# Patient Record
Sex: MALE | Race: WHITE | Employment: STUDENT | URBAN - METROPOLITAN AREA
[De-identification: names, ages, dates, MRNs, and addresses within clinical notes are randomized per-mention and may not be internally consistent; named-entity substitution may affect disease eponyms.]

---
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
## 2023-03-02 ENCOUNTER — OFFICE VISIT (OUTPATIENT)
Dept: PRIMARY CARE CLINIC | Age: 18
End: 2023-03-02

## 2023-03-02 VITALS
WEIGHT: 103 LBS | RESPIRATION RATE: 16 BRPM | DIASTOLIC BLOOD PRESSURE: 56 MMHG | BODY MASS INDEX: 17.16 KG/M2 | HEIGHT: 65 IN | SYSTOLIC BLOOD PRESSURE: 104 MMHG | HEART RATE: 54 BPM | TEMPERATURE: 98 F | OXYGEN SATURATION: 100 %

## 2023-03-02 DIAGNOSIS — K21.9 GASTROESOPHAGEAL REFLUX DISEASE, UNSPECIFIED WHETHER ESOPHAGITIS PRESENT: Primary | ICD-10-CM

## 2023-03-02 DIAGNOSIS — R10.13 EPIGASTRIC PAIN: ICD-10-CM

## 2023-03-02 PROCEDURE — 99213 OFFICE O/P EST LOW 20 MIN: CPT | Performed by: EMERGENCY MEDICINE

## 2023-03-02 RX ORDER — PANTOPRAZOLE SODIUM 40 MG/1
40 TABLET, DELAYED RELEASE ORAL
Qty: 30 TABLET | Refills: 0 | Status: SHIPPED | OUTPATIENT
Start: 2023-03-02

## 2023-03-02 RX ORDER — OMEPRAZOLE 20 MG/1
CAPSULE, DELAYED RELEASE ORAL
COMMUNITY
Start: 2023-02-20 | End: 2023-03-02 | Stop reason: ALTCHOICE

## 2023-03-02 RX ORDER — FLUDROCORTISONE ACETATE 0.1 MG/1
0.1 TABLET ORAL DAILY
COMMUNITY
Start: 2020-12-16

## 2023-03-02 RX ORDER — HYDROXYZINE HYDROCHLORIDE 10 MG/1
TABLET, FILM COATED ORAL
COMMUNITY
Start: 2020-12-13

## 2023-03-02 RX ORDER — FLUVOXAMINE MALEATE 100 MG
TABLET ORAL
COMMUNITY
Start: 2023-02-20

## 2023-03-02 RX ORDER — GUANFACINE 3 MG/1
TABLET, EXTENDED RELEASE ORAL
COMMUNITY
Start: 2023-02-20

## 2023-03-02 ASSESSMENT — ENCOUNTER SYMPTOMS
NAUSEA: 1
WHEEZING: 0
COUGH: 0
ABDOMINAL PAIN: 1
SORE THROAT: 0
BACK PAIN: 0
EYE PAIN: 0
DIARRHEA: 0
VOMITING: 0
SHORTNESS OF BREATH: 0
EYE DISCHARGE: 0
SINUS PRESSURE: 0
EYE REDNESS: 0

## 2023-03-02 NOTE — PROGRESS NOTES
Chief Complaint:   Gastroesophageal Reflux (Having severe reflux the last 12 hours, taking generic Nexium and it has not helped enough recently. Has not been able to get in with his gastroenterologist. Scheduled to see new PCP on 03/15/2023. Sleeping schedule has been off, unsure if this effecting symptoms per patient. Exercising making symptoms worse, having nausea and excessive gas. )      History of Present Illness   HPI:  Harriette Rinne is a 21 y.o. male who presents to [facility] today for worsening GERD symptoms, history of anxiety and GERD concomitantly. Current H2 and PPI not effective    Prior to Visit Medications    Medication Sig Taking?  Authorizing Provider   fludrocortisone (FLORINEF) 0.1 MG tablet Take 0.1 mg by mouth daily Yes Historical Provider, MD   fluvoxaMINE (LUVOX) 100 MG tablet  Yes Historical Provider, MD   guanFACINE HCl ER (INTUNIV) 3 MG TB24 tablet  Yes Historical Provider, MD   hydrOXYzine HCl (ATARAX) 10 MG tablet  Yes Historical Provider, MD   pantoprazole (PROTONIX) 40 MG tablet Take 1 tablet by mouth every morning (before breakfast) Yes Lev Ray,    busPIRone (BUSPAR) 10 MG tablet Take 10 mg by mouth 3 times daily Yes Historical Provider, MD   hydrOXYzine (VISTARIL) 25 MG capsule Take 25 mg by mouth 3 times daily as needed for Itching Yes Historical Provider, MD   polyethylene glycol (GLYCOLAX) 17 g packet Take 17 g by mouth daily as needed for Constipation  Patient not taking: Reported on 3/2/2023  Historical Provider, MD   Diphenhydramine-PE-APAP (Gillian Brakeman EXPRESSMAX PO) Take by mouth  Patient not taking: Reported on 3/2/2023  Historical Provider, MD   benzocaine-menthol (CEPACOL SORE THROAT) 15-3.6 MG lozenge Take 1 lozenge by mouth every 2 hours as needed for Sore Throat  Patient not taking: Reported on 3/2/2023  Historical Provider, MD   brompheniramine-pseudoephedrine-DM 30-2-10 MG/5ML syrup Take 5 mLs by mouth 4 times daily as needed for Congestion or Cough  Patient not taking: Reported on 3/2/2023  Jefferson Merrill MD       Review of Systems   Review of Systems   Constitutional:  Negative for chills and fever. HENT:  Negative for ear pain, sinus pressure and sore throat. Eyes:  Negative for pain, discharge and redness. Respiratory:  Negative for cough, shortness of breath and wheezing. Cardiovascular:  Negative for chest pain. Gastrointestinal:  Positive for abdominal pain and nausea. Negative for diarrhea and vomiting. Genitourinary:  Negative for dysuria and frequency. Musculoskeletal:  Negative for arthralgias and back pain. Skin:  Negative for rash and wound. Neurological:  Negative for weakness and headaches. Hematological:  Negative for adenopathy. Psychiatric/Behavioral: Negative. All other systems reviewed and are negative. Patient's medical, social, and family history reviewed    Past Medical History:  has no past medical history on file. Past Surgical History:  has no past surgical history on file. Social History:  reports that he has never smoked. He has never used smokeless tobacco. He reports that he does not drink alcohol and does not use drugs. Family History: family history is not on file. Allergies: Patient has no known allergies. Physical Exam   Vital Signs:  BP (!) 104/56 (Site: Right Upper Arm, Position: Sitting, Cuff Size: Medium Adult)   Pulse 54   Temp 98 °F (36.7 °C) (Temporal)   Resp 16   Ht 5' 5\" (1.651 m)   Wt 103 lb (46.7 kg)   SpO2 100%   BMI 17.14 kg/m²    Oxygen Saturation Interpretation: Normal.    Physical Exam  Vitals and nursing note reviewed. Constitutional:       Appearance: He is well-developed. HENT:      Head: Normocephalic and atraumatic. Eyes:      Pupils: Pupils are equal, round, and reactive to light. Cardiovascular:      Rate and Rhythm: Normal rate and regular rhythm. Heart sounds: Normal heart sounds. No murmur heard.   Pulmonary:      Effort: Pulmonary effort is normal. No respiratory distress. Breath sounds: Normal breath sounds. No wheezing or rales. Abdominal:      General: Bowel sounds are normal.      Palpations: Abdomen is soft. Tenderness: There is abdominal tenderness in the epigastric area. There is no guarding or rebound. Musculoskeletal:      Cervical back: Normal range of motion and neck supple. Skin:     General: Skin is warm and dry. Neurological:      Mental Status: He is alert and oriented to person, place, and time. Cranial Nerves: No cranial nerve deficit. Coordination: Coordination normal.       Test Results Section   (All laboratory and radiology results have been personally reviewed by myself)  Labs:  No results found for this visit on 03/02/23. Imaging: All Radiology results interpreted by Radiologist unless otherwise noted. No results found. Assessment / Plan   Impression(s):  Saba Pierre was seen today for gastroesophageal reflux. Diagnoses and all orders for this visit:    Gastroesophageal reflux disease, unspecified whether esophagitis present  -     pantoprazole (PROTONIX) 40 MG tablet; Take 1 tablet by mouth every morning (before breakfast)    Epigastric pain  -     pantoprazole (PROTONIX) 40 MG tablet; Take 1 tablet by mouth every morning (before breakfast)       Discussed symptomatic treatments with the patient today. The patient is to schedule a follow-up with PCP in the next 2-3 days for reevaluation. Red flag symptoms were also discussed with the patient today. If symptoms worsen the patient is to go directly to the emergency department for reevaluation and treatment. Pt verbalizes understanding and is in agreement with plan of care. All questions answered.       New Medications     New Prescriptions    PANTOPRAZOLE (PROTONIX) 40 MG TABLET    Take 1 tablet by mouth every morning (before breakfast)       Electronically signed by Asia Bhatt DO   DD: 3/2/23

## 2023-03-20 ENCOUNTER — OFFICE VISIT (OUTPATIENT)
Dept: PRIMARY CARE CLINIC | Age: 18
End: 2023-03-20

## 2023-03-20 VITALS
HEART RATE: 50 BPM | HEIGHT: 64 IN | WEIGHT: 105.6 LBS | BODY MASS INDEX: 18.03 KG/M2 | DIASTOLIC BLOOD PRESSURE: 65 MMHG | TEMPERATURE: 97.9 F | SYSTOLIC BLOOD PRESSURE: 96 MMHG | OXYGEN SATURATION: 98 %

## 2023-03-20 DIAGNOSIS — F42.9 OBSESSIVE-COMPULSIVE DISORDER, UNSPECIFIED TYPE: ICD-10-CM

## 2023-03-20 DIAGNOSIS — K59.00 CONSTIPATION, UNSPECIFIED CONSTIPATION TYPE: Primary | ICD-10-CM

## 2023-03-20 DIAGNOSIS — Z11.59 ENCOUNTER FOR HEPATITIS C SCREENING TEST FOR LOW RISK PATIENT: ICD-10-CM

## 2023-03-20 DIAGNOSIS — Z11.4 ENCOUNTER FOR SCREENING FOR HIV: ICD-10-CM

## 2023-03-20 DIAGNOSIS — K21.9 GASTROESOPHAGEAL REFLUX DISEASE, UNSPECIFIED WHETHER ESOPHAGITIS PRESENT: ICD-10-CM

## 2023-03-20 DIAGNOSIS — R55 SYNCOPE AND COLLAPSE: ICD-10-CM

## 2023-03-20 DIAGNOSIS — F90.9 ATTENTION DEFICIT HYPERACTIVITY DISORDER (ADHD), UNSPECIFIED ADHD TYPE: ICD-10-CM

## 2023-03-20 DIAGNOSIS — Z76.89 ENCOUNTER TO ESTABLISH CARE WITH NEW DOCTOR: ICD-10-CM

## 2023-03-20 LAB
CHP ED QC CHECK: NORMAL
GLUCOSE BLD-MCNC: 94 MG/DL

## 2023-03-20 PROCEDURE — 82962 GLUCOSE BLOOD TEST: CPT | Performed by: FAMILY MEDICINE

## 2023-03-20 PROCEDURE — 99204 OFFICE O/P NEW MOD 45 MIN: CPT | Performed by: FAMILY MEDICINE

## 2023-03-20 RX ORDER — DOCUSATE SODIUM 100 MG/1
100 CAPSULE, LIQUID FILLED ORAL 2 TIMES DAILY
Qty: 60 CAPSULE | Refills: 0 | Status: SHIPPED
Start: 2023-03-20 | End: 2023-03-31

## 2023-03-20 RX ORDER — POLYETHYLENE GLYCOL 3350 17 G/17G
17 POWDER, FOR SOLUTION ORAL DAILY PRN
Qty: 1530 G | Refills: 0 | Status: SHIPPED
Start: 2023-03-20 | End: 2023-03-31

## 2023-03-20 SDOH — ECONOMIC STABILITY: FOOD INSECURITY: WITHIN THE PAST 12 MONTHS, YOU WORRIED THAT YOUR FOOD WOULD RUN OUT BEFORE YOU GOT MONEY TO BUY MORE.: NEVER TRUE

## 2023-03-20 SDOH — ECONOMIC STABILITY: INCOME INSECURITY: HOW HARD IS IT FOR YOU TO PAY FOR THE VERY BASICS LIKE FOOD, HOUSING, MEDICAL CARE, AND HEATING?: NOT VERY HARD

## 2023-03-20 SDOH — ECONOMIC STABILITY: FOOD INSECURITY: WITHIN THE PAST 12 MONTHS, THE FOOD YOU BOUGHT JUST DIDN'T LAST AND YOU DIDN'T HAVE MONEY TO GET MORE.: NEVER TRUE

## 2023-03-20 SDOH — ECONOMIC STABILITY: HOUSING INSECURITY
IN THE LAST 12 MONTHS, WAS THERE A TIME WHEN YOU DID NOT HAVE A STEADY PLACE TO SLEEP OR SLEPT IN A SHELTER (INCLUDING NOW)?: NO

## 2023-03-20 ASSESSMENT — ENCOUNTER SYMPTOMS
ABDOMINAL DISTENTION: 0
RHINORRHEA: 0
BLOOD IN STOOL: 0
EYE DISCHARGE: 0
VOMITING: 0
WHEEZING: 0
SHORTNESS OF BREATH: 0
ABDOMINAL PAIN: 1
CONSTIPATION: 0
SORE THROAT: 0
COUGH: 0
NAUSEA: 0
EYE PAIN: 0
SINUS PRESSURE: 0
CHEST TIGHTNESS: 0
COLOR CHANGE: 0
BACK PAIN: 0
DIARRHEA: 0

## 2023-03-20 ASSESSMENT — PATIENT HEALTH QUESTIONNAIRE - PHQ9
SUM OF ALL RESPONSES TO PHQ9 QUESTIONS 1 & 2: 0
SUM OF ALL RESPONSES TO PHQ QUESTIONS 1-9: 0
2. FEELING DOWN, DEPRESSED OR HOPELESS: 0
SUM OF ALL RESPONSES TO PHQ QUESTIONS 1-9: 0
SUM OF ALL RESPONSES TO PHQ QUESTIONS 1-9: 0
1. LITTLE INTEREST OR PLEASURE IN DOING THINGS: 0
SUM OF ALL RESPONSES TO PHQ QUESTIONS 1-9: 0

## 2023-03-20 ASSESSMENT — LIFESTYLE VARIABLES
HOW OFTEN DO YOU HAVE A DRINK CONTAINING ALCOHOL: NEVER
HOW MANY STANDARD DRINKS CONTAINING ALCOHOL DO YOU HAVE ON A TYPICAL DAY: PATIENT DOES NOT DRINK

## 2023-03-20 NOTE — PROGRESS NOTES
Conjunctiva/sclera: Conjunctivae normal.      Pupils: Pupils are equal, round, and reactive to light. Cardiovascular:      Rate and Rhythm: Normal rate and regular rhythm. Heart sounds: Normal heart sounds. No murmur heard. No friction rub. No gallop. Pulmonary:      Effort: Pulmonary effort is normal.      Breath sounds: Normal breath sounds. Abdominal:      General: Bowel sounds are normal.      Palpations: Abdomen is soft. Tenderness: There is no abdominal tenderness. Hernia: No hernia is present. Musculoskeletal:         General: Normal range of motion. Cervical back: Normal range of motion and neck supple. Skin:     General: Skin is warm and dry. Neurological:      Mental Status: He is alert and oriented to person, place, and time. Psychiatric:         Behavior: Behavior normal.       ASSESSMENT/PLAN:  1. Constipation, unspecified constipation type  AXR placed with information on low FODMAP diet. Previously followed with Bryon MCBRIDE. Referral to GI for chronic constipation and gerd. - XR ABDOMEN (2 VIEWS); Future  - Joann Mendez MD, Gastroenterology, [place]    2. Gastroesophageal reflux disease, unspecified whether esophagitis present  GERD diet and conservative treatment. Continue Protonix. Baseline blood work ordered. - Comprehensive Metabolic Panel; Future  - Joann Mendez MD, Gastroenterology, [facility]  - POCT Glucose  - Allergen, Food, Comprehensive Profile 1; Future    3. Syncope and collapse  Historic. Patient use to follow with Dr. Jennifer Fitzgerald at [facility]. On prn Florinef. Increase hydration, salt, and recommend compression stockings. Proactive measures discussed if he feels episode coming on. Referral placed to Cardiology for patient to get established. Last EKG and Echo on record 2019. - Chastity Saul MD, Cardiology, [facility]    4. Obsessive-compulsive disorder, unspecified type    5.  Attention deficit hyperactivity disorder (ADHD),

## 2023-03-21 ENCOUNTER — NURSE ONLY (OUTPATIENT)
Dept: PRIMARY CARE CLINIC | Age: 18
End: 2023-03-21

## 2023-03-21 DIAGNOSIS — Z11.59 ENCOUNTER FOR HEPATITIS C SCREENING TEST FOR LOW RISK PATIENT: ICD-10-CM

## 2023-03-21 DIAGNOSIS — Z11.4 ENCOUNTER FOR SCREENING FOR HIV: ICD-10-CM

## 2023-03-21 DIAGNOSIS — K21.9 GASTROESOPHAGEAL REFLUX DISEASE, UNSPECIFIED WHETHER ESOPHAGITIS PRESENT: ICD-10-CM

## 2023-03-21 DIAGNOSIS — Z76.89 ENCOUNTER TO ESTABLISH CARE WITH NEW DOCTOR: ICD-10-CM

## 2023-03-21 LAB
ALBUMIN SERPL-MCNC: 4.6 G/DL (ref 3.5–5.2)
ALP SERPL-CCNC: 94 U/L (ref 40–129)
ALT SERPL-CCNC: 6 U/L (ref 0–40)
ANION GAP SERPL CALCULATED.3IONS-SCNC: 16 MMOL/L (ref 7–16)
AST SERPL-CCNC: 20 U/L (ref 0–39)
BASOPHILS # BLD: 0.07 E9/L (ref 0–0.2)
BASOPHILS NFR BLD: 1.3 % (ref 0–2)
BILIRUB SERPL-MCNC: <0.2 MG/DL (ref 0–1.2)
BUN SERPL-MCNC: 11 MG/DL (ref 6–20)
CALCIUM SERPL-MCNC: 9.9 MG/DL (ref 8.6–10.2)
CHLORIDE SERPL-SCNC: 106 MMOL/L (ref 98–107)
CHOLESTEROL, TOTAL: 99 MG/DL (ref 0–199)
CO2 SERPL-SCNC: 23 MMOL/L (ref 22–29)
CREAT SERPL-MCNC: 0.7 MG/DL (ref 0.7–1.2)
EOSINOPHIL # BLD: 0.12 E9/L (ref 0.05–0.5)
EOSINOPHIL NFR BLD: 2.3 % (ref 0–6)
ERYTHROCYTE [DISTWIDTH] IN BLOOD BY AUTOMATED COUNT: 12.2 FL (ref 11.5–15)
GLUCOSE SERPL-MCNC: 89 MG/DL (ref 74–99)
HCT VFR BLD AUTO: 39.8 % (ref 37–54)
HDLC SERPL-MCNC: 32 MG/DL
HGB BLD-MCNC: 13 G/DL (ref 12.5–16.5)
IMM GRANULOCYTES # BLD: 0.01 E9/L
IMM GRANULOCYTES NFR BLD: 0.2 % (ref 0–5)
LDLC SERPL CALC-MCNC: 61 MG/DL (ref 0–99)
LYMPHOCYTES # BLD: 1.82 E9/L (ref 1.5–4)
LYMPHOCYTES NFR BLD: 34.9 % (ref 20–42)
MCH RBC QN AUTO: 27.5 PG (ref 26–35)
MCHC RBC AUTO-ENTMCNC: 32.7 % (ref 32–34.5)
MCV RBC AUTO: 84.3 FL (ref 80–99.9)
MONOCYTES # BLD: 0.36 E9/L (ref 0.1–0.95)
MONOCYTES NFR BLD: 6.9 % (ref 2–12)
NEUTROPHILS # BLD: 2.84 E9/L (ref 1.8–7.3)
NEUTS SEG NFR BLD: 54.4 % (ref 43–80)
PLATELET # BLD AUTO: 256 E9/L (ref 130–450)
PMV BLD AUTO: 11.3 FL (ref 7–12)
POTASSIUM SERPL-SCNC: 4.7 MMOL/L (ref 3.5–5)
PROT SERPL-MCNC: 6.8 G/DL (ref 6.4–8.3)
RBC # BLD AUTO: 4.72 E12/L (ref 3.8–5.8)
SODIUM SERPL-SCNC: 145 MMOL/L (ref 132–146)
TRIGL SERPL-MCNC: 32 MG/DL (ref 0–149)
TSH SERPL-MCNC: 1.45 UIU/ML (ref 0.27–4.2)
VITAMIN D 25-HYDROXY: 27 NG/ML (ref 30–100)
VLDLC SERPL CALC-MCNC: 6 MG/DL
WBC # BLD: 5.2 E9/L (ref 4.5–11.5)

## 2023-03-22 ENCOUNTER — HOSPITAL ENCOUNTER (OUTPATIENT)
Dept: GENERAL RADIOLOGY | Age: 18
Discharge: HOME | End: 2023-03-24
Payer: COMMERCIAL

## 2023-03-22 ENCOUNTER — HOSPITAL ENCOUNTER (OUTPATIENT)
Age: 18
Discharge: HOME | End: 2023-03-24
Payer: COMMERCIAL

## 2023-03-22 DIAGNOSIS — K59.00 CONSTIPATION, UNSPECIFIED CONSTIPATION TYPE: ICD-10-CM

## 2023-03-22 LAB
HCV AB SERPL QL IA: NORMAL
HIV1+2 AB SERPL QL IA: NORMAL

## 2023-03-22 PROCEDURE — 74019 RADEX ABDOMEN 2 VIEWS: CPT

## 2023-03-27 ENCOUNTER — INITIAL CONSULT (OUTPATIENT)
Dept: GASTROENTEROLOGY | Age: 18
End: 2023-03-27
Payer: COMMERCIAL

## 2023-03-27 VITALS
SYSTOLIC BLOOD PRESSURE: 104 MMHG | TEMPERATURE: 97 F | DIASTOLIC BLOOD PRESSURE: 62 MMHG | RESPIRATION RATE: 16 BRPM | WEIGHT: 106 LBS | HEART RATE: 52 BPM | BODY MASS INDEX: 18.1 KG/M2 | HEIGHT: 64 IN | OXYGEN SATURATION: 99 %

## 2023-03-27 DIAGNOSIS — R11.0 NAUSEA: ICD-10-CM

## 2023-03-27 DIAGNOSIS — K59.00 CONSTIPATION, UNSPECIFIED CONSTIPATION TYPE: ICD-10-CM

## 2023-03-27 DIAGNOSIS — R10.9 ABDOMINAL PAIN, UNSPECIFIED ABDOMINAL LOCATION: Primary | ICD-10-CM

## 2023-03-27 PROCEDURE — G8419 CALC BMI OUT NRM PARAM NOF/U: HCPCS | Performed by: NURSE PRACTITIONER

## 2023-03-27 PROCEDURE — G8484 FLU IMMUNIZE NO ADMIN: HCPCS | Performed by: NURSE PRACTITIONER

## 2023-03-27 PROCEDURE — 99202 OFFICE O/P NEW SF 15 MIN: CPT | Performed by: NURSE PRACTITIONER

## 2023-03-27 PROCEDURE — G8427 DOCREV CUR MEDS BY ELIG CLIN: HCPCS | Performed by: NURSE PRACTITIONER

## 2023-03-27 PROCEDURE — 1036F TOBACCO NON-USER: CPT | Performed by: NURSE PRACTITIONER

## 2023-03-27 RX ORDER — PANTOPRAZOLE SODIUM 40 MG/1
40 TABLET, DELAYED RELEASE ORAL 2 TIMES DAILY
Qty: 60 TABLET | Refills: 4 | Status: SHIPPED | OUTPATIENT
Start: 2023-03-27

## 2023-03-27 RX ORDER — BUSPIRONE HYDROCHLORIDE 10 MG/1
10 TABLET ORAL 3 TIMES DAILY
COMMUNITY

## 2023-03-27 NOTE — PROGRESS NOTES
is advised to rule out gastropathy. He is agreeable to proceed    3. Constipation, unspecified constipation type    -advised patient to continue dulcolax twice daily and add Miralax 1 scoop a day. He may tailor it to his needs. -RTV 4 weeks    Return for Follow up in 4 weeks. An electronic signature was used to authenticate this note.     --Yokasta Goel, ARTUR - CNP

## 2023-03-29 ENCOUNTER — TELEPHONE (OUTPATIENT)
Dept: PRIMARY CARE CLINIC | Age: 18
End: 2023-03-29

## 2023-03-29 RX ORDER — LACTULOSE 10 G/15ML
20 SOLUTION ORAL DAILY PRN
Qty: 237 ML | Refills: 0 | Status: SHIPPED | OUTPATIENT
Start: 2023-03-29

## 2023-03-29 NOTE — TELEPHONE ENCOUNTER
Chronic constipation and stool retention evidenced on his xray. Continue the colace and miralax daily. I sent lactulose to use daily PRN constipation, it should stimulate a bm.  If this fails as well, there are better medications to use but insurance usually requires a trial on this first.

## 2023-03-29 NOTE — TELEPHONE ENCOUNTER
Pt has been constipated x 2 days been taking colace twice a day and miralax. He is having abdominal pain, no appetite and gas.  Wants to know what else can he do or should he come in?

## 2023-03-31 ENCOUNTER — APPOINTMENT (OUTPATIENT)
Dept: CT IMAGING | Age: 18
End: 2023-03-31
Payer: COMMERCIAL

## 2023-03-31 ENCOUNTER — HOSPITAL ENCOUNTER (EMERGENCY)
Age: 18
Discharge: HOME | End: 2023-03-31
Attending: EMERGENCY MEDICINE
Payer: COMMERCIAL

## 2023-03-31 VITALS
DIASTOLIC BLOOD PRESSURE: 58 MMHG | RESPIRATION RATE: 16 BRPM | TEMPERATURE: 97.6 F | HEART RATE: 70 BPM | OXYGEN SATURATION: 100 % | SYSTOLIC BLOOD PRESSURE: 99 MMHG

## 2023-03-31 DIAGNOSIS — K59.00 CONSTIPATION, UNSPECIFIED CONSTIPATION TYPE: Primary | ICD-10-CM

## 2023-03-31 LAB
ALBUMIN SERPL-MCNC: 4.7 G/DL (ref 3.5–5.2)
ALP SERPL-CCNC: 96 U/L (ref 40–129)
ALT SERPL-CCNC: 14 U/L (ref 0–40)
ANION GAP SERPL CALCULATED.3IONS-SCNC: 9 MMOL/L (ref 7–16)
AST SERPL-CCNC: 19 U/L (ref 0–39)
BASOPHILS # BLD: 0.05 E9/L (ref 0–0.2)
BASOPHILS NFR BLD: 0.9 % (ref 0–2)
BILIRUB SERPL-MCNC: 0.5 MG/DL (ref 0–1.2)
BUN SERPL-MCNC: 12 MG/DL (ref 6–20)
CALCIUM SERPL-MCNC: 9.9 MG/DL (ref 8.6–10.2)
CHLORIDE SERPL-SCNC: 102 MMOL/L (ref 98–107)
CO2 SERPL-SCNC: 28 MMOL/L (ref 22–29)
CREAT SERPL-MCNC: 0.8 MG/DL (ref 0.7–1.2)
EOSINOPHIL # BLD: 0.18 E9/L (ref 0.05–0.5)
EOSINOPHIL NFR BLD: 3.1 % (ref 0–6)
ERYTHROCYTE [DISTWIDTH] IN BLOOD BY AUTOMATED COUNT: 12 FL (ref 11.5–15)
GLUCOSE SERPL-MCNC: 103 MG/DL (ref 74–99)
HCT VFR BLD AUTO: 42.8 % (ref 37–54)
HGB BLD-MCNC: 14.1 G/DL (ref 12.5–16.5)
IMM GRANULOCYTES # BLD: 0.01 E9/L
IMM GRANULOCYTES NFR BLD: 0.2 % (ref 0–5)
LACTATE BLDV-SCNC: 0.7 MMOL/L (ref 0.5–2.2)
LIPASE: 18 U/L (ref 13–60)
LYMPHOCYTES # BLD: 1.65 E9/L (ref 1.5–4)
LYMPHOCYTES NFR BLD: 28.4 % (ref 20–42)
MCH RBC QN AUTO: 27.5 PG (ref 26–35)
MCHC RBC AUTO-ENTMCNC: 32.9 % (ref 32–34.5)
MCV RBC AUTO: 83.6 FL (ref 80–99.9)
MONOCYTES # BLD: 0.45 E9/L (ref 0.1–0.95)
MONOCYTES NFR BLD: 7.7 % (ref 2–12)
NEUTROPHILS # BLD: 3.47 E9/L (ref 1.8–7.3)
NEUTS SEG NFR BLD: 59.7 % (ref 43–80)
PLATELET # BLD AUTO: 241 E9/L (ref 130–450)
PMV BLD AUTO: 10.7 FL (ref 7–12)
POTASSIUM SERPL-SCNC: 4.5 MMOL/L (ref 3.5–5)
PROT SERPL-MCNC: 7.4 G/DL (ref 6.4–8.3)
RBC # BLD AUTO: 5.12 E12/L (ref 3.8–5.8)
SODIUM SERPL-SCNC: 139 MMOL/L (ref 132–146)
WBC # BLD: 5.8 E9/L (ref 4.5–11.5)

## 2023-03-31 PROCEDURE — 74176 CT ABD & PELVIS W/O CONTRAST: CPT

## 2023-03-31 PROCEDURE — 83605 ASSAY OF LACTIC ACID: CPT

## 2023-03-31 PROCEDURE — 83690 ASSAY OF LIPASE: CPT

## 2023-03-31 PROCEDURE — 99284 EMERGENCY DEPT VISIT MOD MDM: CPT

## 2023-03-31 PROCEDURE — 85025 COMPLETE CBC W/AUTO DIFF WBC: CPT

## 2023-03-31 PROCEDURE — 80053 COMPREHEN METABOLIC PANEL: CPT

## 2023-03-31 PROCEDURE — 36415 COLL VENOUS BLD VENIPUNCTURE: CPT

## 2023-03-31 RX ORDER — SENNOSIDES 8.6 MG
1 TABLET ORAL NIGHTLY PRN
Qty: 120 TABLET | Refills: 0 | Status: SHIPPED | OUTPATIENT
Start: 2023-03-31

## 2023-03-31 RX ORDER — DOCUSATE SODIUM 100 MG/1
100 CAPSULE, LIQUID FILLED ORAL 2 TIMES DAILY
Qty: 60 CAPSULE | Refills: 0 | Status: SHIPPED | OUTPATIENT
Start: 2023-03-31 | End: 2023-04-30

## 2023-03-31 RX ORDER — BISACODYL 10 MG
10 SUPPOSITORY, RECTAL RECTAL DAILY PRN
Qty: 30 SUPPOSITORY | Refills: 0 | Status: SHIPPED | OUTPATIENT
Start: 2023-03-31 | End: 2023-04-30

## 2023-03-31 RX ORDER — POLYETHYLENE GLYCOL 3350 17 G/17G
17 POWDER, FOR SOLUTION ORAL DAILY
Qty: 510 G | Refills: 0 | Status: SHIPPED | OUTPATIENT
Start: 2023-03-31 | End: 2023-04-30

## 2023-03-31 ASSESSMENT — PAIN - FUNCTIONAL ASSESSMENT: PAIN_FUNCTIONAL_ASSESSMENT: NONE - DENIES PAIN

## 2023-03-31 NOTE — Clinical Note
Rick Pavon was seen and treated in our emergency department on 3/31/2023. He may return to school on 04/03/2023. If you have any questions or concerns, please don't hesitate to call.       Dayron Prieto MD

## 2023-04-03 ENCOUNTER — OFFICE VISIT (OUTPATIENT)
Dept: PRIMARY CARE CLINIC | Age: 18
End: 2023-04-03
Payer: COMMERCIAL

## 2023-04-03 VITALS
HEIGHT: 65 IN | RESPIRATION RATE: 16 BRPM | WEIGHT: 104.6 LBS | BODY MASS INDEX: 17.43 KG/M2 | HEART RATE: 53 BPM | TEMPERATURE: 97.6 F | DIASTOLIC BLOOD PRESSURE: 58 MMHG | OXYGEN SATURATION: 98 % | SYSTOLIC BLOOD PRESSURE: 118 MMHG

## 2023-04-03 DIAGNOSIS — K59.00 CONSTIPATION, UNSPECIFIED CONSTIPATION TYPE: Primary | ICD-10-CM

## 2023-04-03 DIAGNOSIS — Z87.898 HISTORY OF SYNCOPE: ICD-10-CM

## 2023-04-03 DIAGNOSIS — E55.9 VITAMIN D DEFICIENCY: ICD-10-CM

## 2023-04-03 DIAGNOSIS — K21.9 GASTROESOPHAGEAL REFLUX DISEASE, UNSPECIFIED WHETHER ESOPHAGITIS PRESENT: ICD-10-CM

## 2023-04-03 PROCEDURE — G8419 CALC BMI OUT NRM PARAM NOF/U: HCPCS | Performed by: FAMILY MEDICINE

## 2023-04-03 PROCEDURE — 1036F TOBACCO NON-USER: CPT | Performed by: FAMILY MEDICINE

## 2023-04-03 PROCEDURE — 99214 OFFICE O/P EST MOD 30 MIN: CPT | Performed by: FAMILY MEDICINE

## 2023-04-03 PROCEDURE — G8427 DOCREV CUR MEDS BY ELIG CLIN: HCPCS | Performed by: FAMILY MEDICINE

## 2023-04-03 NOTE — PROGRESS NOTES
[facility])  Family Medicine Outpatient        SUBJECTIVE:  CC: had concerns including Constipation (Symptoms are worse since last visit. No nausea/vomiting. Last BM was yesterday at 3 a.m.). HPI:  Peyman Murguia is a male 21 y.o. presented to the clinic for a follow up apt. On 3/27 he saw gi for constipation, gerd, and abdominal pain x 6 years. Protonix was increased to bid and has helped some. An EGD is pending. He was in the ED 3/31 for worsening abdominal pain. He notes it had been 5 days since he went to the bathroom. He was discharged on Dulcolax and Miralax. He forgot all his medication yesterday. Stopped Colace Saturday. His last bm was yesterday. Review of Systems   Constitutional:  Negative for appetite change, fatigue and fever. Respiratory:  Negative for cough, shortness of breath and wheezing. Cardiovascular:  Negative for chest pain and palpitations. Gastrointestinal:  Positive for constipation. Negative for abdominal pain, diarrhea, nausea and vomiting.         Gerd     Outpatient Medications Marked as Taking for the 4/3/23 encounter (Office Visit) with Sayra Centeno MD   Medication Sig Dispense Refill    polyethylene glycol (GLYCOLAX) 17 GM/SCOOP powder Take 17 g by mouth daily 510 g 0    docusate sodium (COLACE) 100 MG capsule Take 1 capsule by mouth 2 times daily 60 capsule 0    bisacodyl (DULCOLAX) 10 MG suppository Place 1 suppository rectally daily as needed for Constipation 30 suppository 0    senna (SENOKOT) 8.6 MG TABS tablet Take 1 tablet by mouth nightly as needed for Constipation 120 tablet 0    busPIRone (BUSPAR) 10 MG tablet Take 1 tablet by mouth in the morning and at bedtime      pantoprazole (PROTONIX) 40 MG tablet Take 1 tablet by mouth 2 times daily 60 tablet 4    fluvoxaMINE (LUVOX) 100 MG tablet Take 1 tablet by mouth nightly      guanFACINE HCl ER (INTUNIV) 3 MG TB24 tablet Take 1 tablet by mouth daily      hydrOXYzine (VISTARIL) 25 MG capsule Take 1 capsule by

## 2023-04-03 NOTE — ED PROVIDER NOTES
Financial Resource Strain: Low Risk     Difficulty of Paying Living Expenses: Not very hard   Food Insecurity: No Food Insecurity    Worried About Running Out of Food in the Last Year: Never true    Ran Out of Food in the Last Year: Never true   Transportation Needs: Unknown    Lack of Transportation (Non-Medical): No   Housing Stability: Unknown    Unstable Housing in the Last Year: No       Family History:   Family History   Problem Relation Age of Onset    Other Mother         IBS    Anxiety Disorder Mother     Anxiety Disorder Sister     Autism Sister     Anxiety Disorder Brother     Asthma Brother        The patients home medications have been reviewed. Allergies:   No Known Allergies        ---------------------------------------------------PHYSICAL EXAM--------------------------------------    BP (!) 107/56   Pulse (!) 48   Temp 97.6 °F (36.4 °C) (Temporal)   Resp 16   SpO2 97%     Physical Exam  Vitals and nursing note reviewed. Constitutional:       General: He is not in acute distress. Appearance: He is not toxic-appearing. HENT:      Mouth/Throat:      Mouth: Mucous membranes are moist.   Eyes:      General: No scleral icterus. Extraocular Movements: Extraocular movements intact. Pupils: Pupils are equal, round, and reactive to light. Cardiovascular:      Rate and Rhythm: Normal rate and regular rhythm. Pulses: Normal pulses. Heart sounds: Normal heart sounds. No murmur heard. Pulmonary:      Effort: Pulmonary effort is normal. No respiratory distress. Breath sounds: Normal breath sounds. No wheezing or rales. Abdominal:      General: There is no distension. Palpations: Abdomen is soft. Tenderness: There is abdominal tenderness (mild diffuse nonfocal). There is no guarding or rebound. Musculoskeletal:         General: No swelling or tenderness. Normal range of motion. Cervical back: Normal range of motion and neck supple. No rigidity.  No Cibinqo Pregnancy And Lactation Text: It is unknown if this medication will adversely affect pregnancy or breast feeding.  You should not take this medication if you are currently pregnant or planning a pregnancy or while breastfeeding.

## 2023-04-05 PROBLEM — F42.9 OBSESSIVE-COMPULSIVE DISORDER: Status: ACTIVE | Noted: 2023-04-05

## 2023-04-05 PROBLEM — K21.9 GASTROESOPHAGEAL REFLUX DISEASE: Status: ACTIVE | Noted: 2023-04-05

## 2023-04-05 PROBLEM — K59.00 CONSTIPATION: Status: ACTIVE | Noted: 2023-04-05

## 2023-04-05 PROBLEM — R55 SYNCOPE AND COLLAPSE: Status: ACTIVE | Noted: 2023-04-05

## 2023-04-05 PROBLEM — F90.9 ATTENTION DEFICIT HYPERACTIVITY DISORDER (ADHD): Status: ACTIVE | Noted: 2023-04-05

## 2023-04-09 PROBLEM — E55.9 VITAMIN D DEFICIENCY: Status: ACTIVE | Noted: 2023-04-09

## 2023-04-09 PROBLEM — Z87.898 HISTORY OF SYNCOPE: Status: ACTIVE | Noted: 2023-04-09

## 2023-04-09 LAB
BARLEY IGE QN: <0.1 KU/L (ref 0–0.34)
BEEF IGE QN: <0.1 KU/L (ref 0–0.34)
BELL PEPPER IGE QN: <0.1 KU/L (ref 0–0.34)
CABBAGE IGE QN: <0.1 KU/L (ref 0–0.34)
CARROT IGE QN: <0.1 KU/L (ref 0–0.34)
CHICKEN SERUM PROT IGE QN: <0.1 KU/L (ref 0–0.34)
CODFISH IGE QN: <0.1 KU/L (ref 0–0.34)
CORN IGE QN: <0.1 KU/L (ref 0–0.34)
COW MILK IGE QN: 0.15 KU/L (ref 0–0.34)
CRAB IGE QN: <0.1 KU/L (ref 0–0.34)
EGG WHITE IGE QN: <0.1 KU/L (ref 0–0.34)
GRAPE IGE QN: <0.1 KU/L (ref 0–0.34)
IGE SERPL-ACNC: 34 IU/ML
LETTUCE IGE QN: <0.1 KU/L (ref 0–0.34)
OAT IGE QN: <0.1 KU/L (ref 0–0.34)
ORANGE IGE QN: <0.1 KU/L (ref 0–0.34)
PEANUT IGE QN: <0.1 KU/L (ref 0–0.34)
PORK IGE QN: <0.1 KU/L (ref 0–0.34)
POTATO IGE QN: <0.1 KU/L (ref 0–0.34)
RICE IGE QN: <0.1 KU/L (ref 0–0.34)
RYE IGE QN: <0.1 KU/L (ref 0–0.34)
SHRIMP IGE QN: <0.1 KU/L (ref 0–0.34)
SOYBEAN IGE QN: <0.1 KU/L (ref 0–0.34)
TOMATO IGE QN: <0.1 KU/L (ref 0–0.34)
TUNA IGE QN: <0.1 KU/L (ref 0–0.34)
WHEAT IGE QN: <0.1 KU/L (ref 0–0.34)
WHITE BEAN IGE QN: <0.1 KU/L (ref 0–0.34)

## 2023-04-09 ASSESSMENT — ENCOUNTER SYMPTOMS
ABDOMINAL PAIN: 0
DIARRHEA: 0
CONSTIPATION: 1
WHEEZING: 0
VOMITING: 0
SHORTNESS OF BREATH: 0
COUGH: 0
NAUSEA: 0

## 2023-04-10 ENCOUNTER — TELEPHONE (OUTPATIENT)
Dept: ADMINISTRATIVE | Age: 18
End: 2023-04-10

## 2023-04-19 ENCOUNTER — TELEPHONE (OUTPATIENT)
Dept: PRIMARY CARE CLINIC | Age: 18
End: 2023-04-19

## 2023-04-19 NOTE — TELEPHONE ENCOUNTER
Received VM from patient stating he had requested a referral for a dietician, has not yet been contacted. Called patient, notified him the only referral I saw was for Gastroenterology. Patient stated he was contacted by Gastroenterology & has an appointment for 4/26/2023. Stated he would like a referral placed for a dietician as soon as possible.

## 2023-04-28 ENCOUNTER — OFFICE VISIT (OUTPATIENT)
Dept: GASTROENTEROLOGY | Age: 18
End: 2023-04-28
Payer: COMMERCIAL

## 2023-04-28 VITALS
OXYGEN SATURATION: 99 % | DIASTOLIC BLOOD PRESSURE: 74 MMHG | WEIGHT: 108 LBS | BODY MASS INDEX: 17.99 KG/M2 | TEMPERATURE: 97.4 F | RESPIRATION RATE: 18 BRPM | SYSTOLIC BLOOD PRESSURE: 128 MMHG | HEIGHT: 65 IN | HEART RATE: 60 BPM

## 2023-04-28 DIAGNOSIS — K59.00 CONSTIPATION, UNSPECIFIED CONSTIPATION TYPE: ICD-10-CM

## 2023-04-28 DIAGNOSIS — R10.9 ABDOMINAL PAIN, UNSPECIFIED ABDOMINAL LOCATION: Primary | ICD-10-CM

## 2023-04-28 PROCEDURE — G8419 CALC BMI OUT NRM PARAM NOF/U: HCPCS | Performed by: NURSE PRACTITIONER

## 2023-04-28 PROCEDURE — 1036F TOBACCO NON-USER: CPT | Performed by: NURSE PRACTITIONER

## 2023-04-28 PROCEDURE — 99213 OFFICE O/P EST LOW 20 MIN: CPT | Performed by: NURSE PRACTITIONER

## 2023-04-28 PROCEDURE — G8427 DOCREV CUR MEDS BY ELIG CLIN: HCPCS | Performed by: NURSE PRACTITIONER

## 2023-04-28 NOTE — PROGRESS NOTES
Mary Tubbs (:  2005) is a 21 y.o. male, here for evaluation of the following chief complaint(s):  Follow-up      SUBJECTIVE/OBJECTIVE:  HPI:    Ann Marie Bustillos is a very pleasant 21year old gentleman that presents today for a follow up on generalized abdominal pain    Started on Pantoprazole 40 mg daily at the beginning of March  Increased to twice daily after the last office visit  The pain has improved; still complains of pain upon awakening=  Not a smoker  Takes ibuprofen on occasion  No alcohol intake    Presented to the ER for constipation in March  Was given a RS and Senna ; this did promote a BM  Lactulose, senna, and Miralax was advised  Patient used this for a short time with improvement   Now he is down to 1 stool softener a day and his bowels are moving    Started to eat vegetables and will be seeing a nutritionist  Trying to eat a low fodmap diet    EGD in  showed mild chronic gastritis and reflux esophagitis  Abdominal xray recently showed diffuse fecal retention    Patient is scheduled for an EGD in September          ROS:  General: Patient denies n/v/f/c or weight loss. HEENT: Patient denies persistent postnasal drip, scleral icterus, drooling, persistent bleeding from nose/mouth. Resp: Patient denies SOB, wheezing, productive cough. Cards: Patient denies CP, palpitations, significant edema  GI: As above. Derm: Patient denies jaundice/rashes. Musc: Patient denies diffuse/irregular joint swelling or myalgias. Objective   Wt Readings from Last 3 Encounters:   23 108 lb (49 kg) (<1 %, Z= -2.66)*   23 104 lb 9.6 oz (47.4 kg) (<1 %, Z= -2.94)*   23 106 lb (48.1 kg) (<1 %, Z= -2.81)*     * Growth percentiles are based on CDC (Boys, 2-20 Years) data.      Temp Readings from Last 3 Encounters:   23 97.4 °F (36.3 °C) (Infrared)   23 97.6 °F (36.4 °C) (Temporal)   23 97.6 °F (36.4 °C) (Temporal)     BP Readings from Last 3 Encounters:   23 128/74

## 2023-05-02 PROBLEM — R55 SYNCOPE AND COLLAPSE: Status: RESOLVED | Noted: 2023-04-05 | Resolved: 2023-05-02

## 2023-05-02 PROBLEM — K59.00 CONSTIPATION: Status: RESOLVED | Noted: 2023-04-05 | Resolved: 2023-05-02

## 2023-05-09 ENCOUNTER — CLINICAL DOCUMENTATION (OUTPATIENT)
Dept: NUTRITION | Age: 18
End: 2023-05-09

## 2023-05-09 NOTE — PROGRESS NOTES
TB24 tablet Take 1 tablet by mouth daily      hydrOXYzine (VISTARIL) 25 MG capsule Take 1 capsule by mouth 3 times daily as needed for Itching       No current facility-administered medications on file prior to visit. Supplements/OTC:  N/a    Notes:   Patient reports having sharp, dull stomach pains after eating. Has to wait an hour and half after waking to eat due to medication and to prevent GI upset. Symptoms are worse in the morning. Struggles with acid reflux, but this improved with protonix. If he eats too soon in the morning, he will become constipated and then will have loose stools after. Patient reports he has been following a Low Fodmap diet for about 2 months and states his symptoms have improved. He has started reintroducing high fodmap foods back into his diet, which he has tolerated so far. Patient states he is also trying to gain weight as he is underweight. He states he has been sick over the past week and due to his dietary restrictions has been struggling to gain weight. He would like some ideas on how to add more variety into his diet. Does not tolerate: Cauliflower, broccoli, eggs (can tolerate eggs baked in foods, such as pancakes), milk (drinks lactose free)    We reviewed the Low Fodmap diet and reintroduction steps and provided written handout/instructions. Provided patient with balanced meal planning tips, meal/snack ideas, sample Ensure Complete (2 bottles) along with some Ensure coupons. Encouraged patient to drink Ensure between meals and to continue eating 3 meals per day focusing on including adequate protein. All questions were answered and patient voiced understanding of information reviewed & discussed.      Follow-Up Appointment:  July 27 at 10 am

## 2023-05-15 ENCOUNTER — OFFICE VISIT (OUTPATIENT)
Dept: PRIMARY CARE CLINIC | Age: 18
End: 2023-05-15
Payer: COMMERCIAL

## 2023-05-15 VITALS
TEMPERATURE: 97.9 F | BODY MASS INDEX: 17.23 KG/M2 | SYSTOLIC BLOOD PRESSURE: 100 MMHG | DIASTOLIC BLOOD PRESSURE: 60 MMHG | HEIGHT: 65 IN | HEART RATE: 77 BPM | OXYGEN SATURATION: 98 % | RESPIRATION RATE: 16 BRPM | WEIGHT: 103.4 LBS

## 2023-05-15 DIAGNOSIS — K59.00 CONSTIPATION, UNSPECIFIED CONSTIPATION TYPE: Primary | ICD-10-CM

## 2023-05-15 DIAGNOSIS — N48.89 PENILE PAIN: ICD-10-CM

## 2023-05-15 DIAGNOSIS — F41.9 ANXIETY: ICD-10-CM

## 2023-05-15 DIAGNOSIS — K21.9 GASTROESOPHAGEAL REFLUX DISEASE, UNSPECIFIED WHETHER ESOPHAGITIS PRESENT: ICD-10-CM

## 2023-05-15 DIAGNOSIS — J32.9 SINUSITIS, UNSPECIFIED CHRONICITY, UNSPECIFIED LOCATION: ICD-10-CM

## 2023-05-15 DIAGNOSIS — R23.8 PAPULE: ICD-10-CM

## 2023-05-15 PROCEDURE — 1036F TOBACCO NON-USER: CPT | Performed by: FAMILY MEDICINE

## 2023-05-15 PROCEDURE — 99214 OFFICE O/P EST MOD 30 MIN: CPT | Performed by: FAMILY MEDICINE

## 2023-05-15 PROCEDURE — G8427 DOCREV CUR MEDS BY ELIG CLIN: HCPCS | Performed by: FAMILY MEDICINE

## 2023-05-15 PROCEDURE — G8419 CALC BMI OUT NRM PARAM NOF/U: HCPCS | Performed by: FAMILY MEDICINE

## 2023-05-15 RX ORDER — BENZONATATE 100 MG/1
CAPSULE ORAL
COMMUNITY
Start: 2023-05-11

## 2023-05-15 RX ORDER — POLYETHYLENE GLYCOL 3350 17 G/17G
17 POWDER, FOR SOLUTION ORAL DAILY PRN
COMMUNITY

## 2023-05-15 RX ORDER — ONDANSETRON 4 MG/1
4 TABLET, FILM COATED ORAL EVERY 6 HOURS PRN
COMMUNITY
Start: 2023-05-11

## 2023-05-15 RX ORDER — DOCUSATE SODIUM 100 MG/1
100 CAPSULE, LIQUID FILLED ORAL 2 TIMES DAILY
COMMUNITY

## 2023-05-15 RX ORDER — AMOXICILLIN AND CLAVULANATE POTASSIUM 875; 125 MG/1; MG/1
TABLET, FILM COATED ORAL
COMMUNITY
Start: 2023-05-11

## 2023-05-15 RX ORDER — BUSPIRONE HYDROCHLORIDE 10 MG/1
10 TABLET ORAL 2 TIMES DAILY
Qty: 180 TABLET | Refills: 0 | Status: SHIPPED | OUTPATIENT
Start: 2023-05-15

## 2023-05-22 ASSESSMENT — ENCOUNTER SYMPTOMS
SHORTNESS OF BREATH: 0
VOMITING: 0
ABDOMINAL PAIN: 0
WHEEZING: 0
NAUSEA: 0
DIARRHEA: 0
COUGH: 0
CONSTIPATION: 0

## 2023-05-25 ENCOUNTER — OFFICE VISIT (OUTPATIENT)
Dept: PRIMARY CARE CLINIC | Age: 18
End: 2023-05-25
Payer: COMMERCIAL

## 2023-05-25 VITALS
OXYGEN SATURATION: 97 % | WEIGHT: 105 LBS | SYSTOLIC BLOOD PRESSURE: 98 MMHG | HEIGHT: 65 IN | HEART RATE: 70 BPM | DIASTOLIC BLOOD PRESSURE: 68 MMHG | BODY MASS INDEX: 17.49 KG/M2 | TEMPERATURE: 98.4 F | RESPIRATION RATE: 18 BRPM

## 2023-05-25 DIAGNOSIS — J02.9 SORE THROAT: ICD-10-CM

## 2023-05-25 DIAGNOSIS — J06.9 VIRAL URI: Primary | ICD-10-CM

## 2023-05-25 LAB — S PYO AG THROAT QL: NORMAL

## 2023-05-25 PROCEDURE — 87880 STREP A ASSAY W/OPTIC: CPT | Performed by: NURSE PRACTITIONER

## 2023-05-25 PROCEDURE — G8427 DOCREV CUR MEDS BY ELIG CLIN: HCPCS | Performed by: NURSE PRACTITIONER

## 2023-05-25 PROCEDURE — 1036F TOBACCO NON-USER: CPT | Performed by: NURSE PRACTITIONER

## 2023-05-25 PROCEDURE — G8419 CALC BMI OUT NRM PARAM NOF/U: HCPCS | Performed by: NURSE PRACTITIONER

## 2023-05-25 PROCEDURE — 99213 OFFICE O/P EST LOW 20 MIN: CPT | Performed by: NURSE PRACTITIONER

## 2023-05-25 NOTE — PROGRESS NOTES
Chief Complaint:   Pharyngitis (Sore throat, cough, runny nose x 1 day. States he did not take any OTC medications. )    History of Present Illness   Source of history provided by:  patient. David Friedman is a 21 y.o. old male who presents to walk-in for sore throat, which began 1 day(s) prior to arrival. The symptoms are associated with nasal congestion, rhinorrhea, laryngitis and dry cough. There has been NO fever, chills, N/V/D, chest pain or SOB. Denies any hx of asthma, COPD, or tobacco use. Has not taken anything for the symptoms. Denies any known sick contacts. Review of Systems   Unless otherwise stated in this report or unable to obtain because of the patient's clinical or mental status as evidenced by the medical record, this patients's positive and negative responses for Review of Systems, constitutional, psych, eyes, ENT, cardiovascular, respiratory, gastrointestinal, neurological, genitourinary, musculoskeletal, integument systems and systems related to the presenting problem are either stated in the preceding or were not pertinent or were negative for the symptoms and/or complaints related to the medical problem. Past Medical History:  has a past medical history of ADHD, OCD (obsessive compulsive disorder), and Social anxiety disorder. Past Surgical History:  has a past surgical history that includes hernia repair. Social History:  reports that he has never smoked. He has never been exposed to tobacco smoke. He has never used smokeless tobacco. He reports that he does not drink alcohol and does not use drugs. Family History: family history includes Anxiety Disorder in his brother, mother, and sister; Asthma in his brother; Autism in his sister; Other in his mother. Allergies: Patient has no known allergies.     Physical Exam   Vital Signs:  BP 98/68   Pulse 70   Temp 98.4 °F (36.9 °C) (Oral)   Resp 18   Ht 5' 4.75\" (1.645 m)   Wt 105 lb (47.6 kg)   SpO2 97%   BMI 17.61 kg/m²

## 2023-06-02 ENCOUNTER — OFFICE VISIT (OUTPATIENT)
Dept: GASTROENTEROLOGY | Age: 18
End: 2023-06-02
Payer: COMMERCIAL

## 2023-06-02 VITALS
RESPIRATION RATE: 16 BRPM | DIASTOLIC BLOOD PRESSURE: 61 MMHG | SYSTOLIC BLOOD PRESSURE: 98 MMHG | OXYGEN SATURATION: 96 % | HEART RATE: 50 BPM | HEIGHT: 65 IN | BODY MASS INDEX: 17.25 KG/M2 | WEIGHT: 103.5 LBS | TEMPERATURE: 97.2 F

## 2023-06-02 DIAGNOSIS — R10.9 ABDOMINAL PAIN, UNSPECIFIED ABDOMINAL LOCATION: ICD-10-CM

## 2023-06-02 DIAGNOSIS — K21.9 GASTROESOPHAGEAL REFLUX DISEASE, UNSPECIFIED WHETHER ESOPHAGITIS PRESENT: Primary | ICD-10-CM

## 2023-06-02 DIAGNOSIS — J30.2 SEASONAL ALLERGIES: ICD-10-CM

## 2023-06-02 DIAGNOSIS — K59.00 CONSTIPATION, UNSPECIFIED CONSTIPATION TYPE: ICD-10-CM

## 2023-06-02 PROCEDURE — 1036F TOBACCO NON-USER: CPT | Performed by: NURSE PRACTITIONER

## 2023-06-02 PROCEDURE — G8427 DOCREV CUR MEDS BY ELIG CLIN: HCPCS | Performed by: NURSE PRACTITIONER

## 2023-06-02 PROCEDURE — G8419 CALC BMI OUT NRM PARAM NOF/U: HCPCS | Performed by: NURSE PRACTITIONER

## 2023-06-02 PROCEDURE — 99213 OFFICE O/P EST LOW 20 MIN: CPT | Performed by: NURSE PRACTITIONER

## 2023-06-02 RX ORDER — CETIRIZINE HYDROCHLORIDE 10 MG/1
10 TABLET ORAL DAILY
Qty: 30 TABLET | Refills: 5 | Status: SHIPPED | OUTPATIENT
Start: 2023-06-02 | End: 2023-07-02

## 2023-06-02 NOTE — PROGRESS NOTES
Marlon Ruvalcaba (:  2005) is a 21 y.o. male, here for evaluation of the following chief complaint(s):  Follow-up (Pt is here for a follow up for general GI complaints and t be released to go back to college.)      SUBJECTIVE/OBJECTIVE:  HPI:    Kavya Granda is a very pleasant 21year old gentleman that presents today for follow up on constipation and abdominal pain    Tells me he is eating \"better\"  Followed up with a nutritionist; now has meal plans  Following the low fodmap diet    The constipation has improved; takes Miralax as needed  Will add Senna on occasion  Tells me today that the abdominal pain presents when he overeats  He is now following recommended servings on the back of packages    Pantoprazole 40 mg daily has satisfied acid reflux and abdominal pain    Patient is scheduled for EGD in the fall    Coughing in the room today  Cannot afford allergy medication                   ROS:  General: Patient denies n/v/f/c or weight loss. HEENT: Patient denies persistent postnasal drip, scleral icterus, drooling, persistent bleeding from nose/mouth. Resp: Patient denies SOB, wheezing, productive cough. Cards: Patient denies CP, palpitations, significant edema  GI: As above. Derm: Patient denies jaundice/rashes. Musc: Patient denies diffuse/irregular joint swelling or myalgias. Objective   Wt Readings from Last 3 Encounters:   23 103 lb 8 oz (46.9 kg) (<1 %, Z= -3.05)*   23 105 lb (47.6 kg) (<1 %, Z= -2.92)*   05/15/23 103 lb 6.4 oz (46.9 kg) (<1 %, Z= -3.05)*     * Growth percentiles are based on CDC (Boys, 2-20 Years) data.      Temp Readings from Last 3 Encounters:   23 97.2 °F (36.2 °C) (Infrared)   23 98.4 °F (36.9 °C) (Oral)   05/15/23 97.9 °F (36.6 °C) (Temporal)     BP Readings from Last 3 Encounters:   23 98/61   23 98/68   05/15/23 100/60     Pulse Readings from Last 3 Encounters:   23 50   23 70   05/15/23 77        Physical

## 2023-06-14 PROBLEM — J32.9 SINUSITIS: Status: RESOLVED | Noted: 2023-05-15 | Resolved: 2023-06-14

## 2023-07-27 ENCOUNTER — CLINICAL DOCUMENTATION (OUTPATIENT)
Dept: NUTRITION | Age: 18
End: 2023-07-27

## 2023-07-27 NOTE — PROGRESS NOTES
Nutrition Follow Up Note    Date: 7/27/2023  Patient: Ericka Cunningham  Referring Clinician: Santiago Bennett MD  Fax: [phone]   Patient's Last Session: 5/9/23  Reason for Visit: weight gain    Current eating pattern:   24 hr recall:  Breakfast: 1 cup raisin bran w/ lactose free milk   Ham sandwich on Whole wheat bread   Trail mix, granola bar, regular yogurt  Dinner: popcorn chicken 16 pieces   Fluids: water ~8 8oz. Glasses per day    Alternative breakfast: banana, 2 pieces whole wheat bread, 1 spoonful PB, 1 cup milk    Current exercise routine:   Patient states he would like to start lifting weights a few days a week    Last visit weight: 104# 5/9/23  CBW: 101.3#  Height: 5'4.75\"    Weight is down 3#(2.9%) since last visit ~2 months ago    Handouts given:   High calorie, High protein nutrition therapy & recipes    Notes:   Patient states he figured out he was eating too large portions of cereal, so has decreased the portion size to 1 cup instead of 2 cups to help with GI symptoms. States he had some acid reflux due to stress a few times, but does not report any significant GI symptoms/complaints at this time. He continues to follow a low fodmap diet & avoid foods that trigger his GI symptoms, such as dairy, cauliflower, broccoli, and eggs and possibly peas. Patient states he likes to cook, however has been having some roommate struggles and states the kitchen is not clean enough to cook at times. He does have frozen meals that he will eat, but would like to cook more often. He states at times his dinner will be a dry package of ramen noodles. Patient states he has struggled with some depression lately, which may effect his meal intake. We reviewed the high calorie, high protein nutrition guidelines and encouraged patient to aim to eat 3 meals a day with 3 snacks in between. He states he would like to start drinking carnation instant breakfast as he did not love the taste of ensure.  Encouraged patient to

## 2023-08-03 DIAGNOSIS — K21.9 GASTRO-ESOPHAGEAL REFLUX DISEASE WITHOUT ESOPHAGITIS: ICD-10-CM

## 2023-08-03 DIAGNOSIS — R10.13 EPIGASTRIC PAIN: ICD-10-CM

## 2023-08-07 RX ORDER — PANTOPRAZOLE SODIUM 40 MG/1
TABLET, DELAYED RELEASE ORAL
Qty: 60 TABLET | Refills: 4 | Status: SHIPPED | OUTPATIENT
Start: 2023-08-07

## 2023-08-09 ENCOUNTER — PREP FOR PROCEDURE (OUTPATIENT)
Dept: GASTROENTEROLOGY | Age: 18
End: 2023-08-09

## 2023-08-09 ENCOUNTER — TELEPHONE (OUTPATIENT)
Dept: GASTROENTEROLOGY | Age: 18
End: 2023-08-09

## 2023-08-09 DIAGNOSIS — F41.9 ANXIETY: ICD-10-CM

## 2023-08-09 PROBLEM — R11.0 NAUSEA: Status: ACTIVE | Noted: 2023-08-09

## 2023-08-09 PROBLEM — R10.9 ABDOMINAL PAIN: Status: ACTIVE | Noted: 2023-08-09

## 2023-08-09 NOTE — TELEPHONE ENCOUNTER
Prior Authorization Form:      DEMOGRAPHICS:                     Patient Name:  Betnilam Pod  Patient :  2005            Insurance:  Payor: Silas Acosta / Plan: Jez Diss / Product Type: *No Product type* /   Insurance ID Number:    Payer/Plan Subscr  Sex Relation Sub.  Ins. ID Effective Group Num   1. DIRK Lewis Pod [birth year] Male Self [id number] [address] [facility] [address]         DIAGNOSIS & PROCEDURE:                       Procedure/Operation: EGD           CPT Code: 26829    Diagnosis:  Nausea    ICD10 Code: R11.0    Location:  [address] [place] [address]    Surgeon:  Dr. Terrance Sarabia     SCHEDULING INFORMATION:                          Date: 2023    Time: TBD              Anesthesia:  MAC/TIVA                                                       Status:  Outpatient            Electronically signed by Yarelis Macias MA on 2023 at 2:03 PM

## 2023-08-09 NOTE — TELEPHONE ENCOUNTER
Last Appointment:  5/15/2023  Future Appointments   Date Time Provider [address]   8/23/2023  9:00 AM Kale Sherman MD [facility]   10/2/2023  2:00 PM ARTUR Storey - [facility]

## 2023-08-10 RX ORDER — BUSPIRONE HYDROCHLORIDE 10 MG/1
TABLET ORAL
Qty: 180 TABLET | Refills: 0 | Status: SHIPPED | OUTPATIENT
Start: 2023-08-10

## 2023-08-23 ENCOUNTER — OFFICE VISIT (OUTPATIENT)
Dept: PRIMARY CARE CLINIC | Age: 18
End: 2023-08-23

## 2023-08-23 VITALS
TEMPERATURE: 97.8 F | HEART RATE: 59 BPM | DIASTOLIC BLOOD PRESSURE: 58 MMHG | RESPIRATION RATE: 18 BRPM | OXYGEN SATURATION: 98 % | BODY MASS INDEX: 17.19 KG/M2 | WEIGHT: 103.2 LBS | SYSTOLIC BLOOD PRESSURE: 108 MMHG | HEIGHT: 65 IN

## 2023-08-23 DIAGNOSIS — S16.1XXA STRAIN OF NECK MUSCLE, INITIAL ENCOUNTER: ICD-10-CM

## 2023-08-23 DIAGNOSIS — K59.00 CONSTIPATION, UNSPECIFIED CONSTIPATION TYPE: Primary | ICD-10-CM

## 2023-08-23 DIAGNOSIS — Z23 NEED FOR COVID-19 VACCINE: ICD-10-CM

## 2023-08-23 DIAGNOSIS — K21.9 GASTROESOPHAGEAL REFLUX DISEASE, UNSPECIFIED WHETHER ESOPHAGITIS PRESENT: ICD-10-CM

## 2023-08-23 PROCEDURE — 91312 COVID-19, PFIZER BIVALENT, DO NOT DILUTE, (AGE 12Y+), IM, 30 MCG/0.3 ML: CPT | Performed by: FAMILY MEDICINE

## 2023-08-23 PROCEDURE — 0124A COVID-19, PFIZER BIVALENT, DO NOT DILUTE, (AGE 12Y+), IM, 30 MCG/0.3 ML: CPT | Performed by: FAMILY MEDICINE

## 2023-08-23 PROCEDURE — 99214 OFFICE O/P EST MOD 30 MIN: CPT | Performed by: FAMILY MEDICINE

## 2023-08-23 RX ORDER — SUCRALFATE 1 G/1
1 TABLET ORAL DAILY PRN
Qty: 30 TABLET | Refills: 0 | Status: SHIPPED | OUTPATIENT
Start: 2023-08-23

## 2023-08-23 ASSESSMENT — ENCOUNTER SYMPTOMS
NAUSEA: 0
DIARRHEA: 0
SHORTNESS OF BREATH: 0
ABDOMINAL PAIN: 0
VOMITING: 0
WHEEZING: 0
BLOOD IN STOOL: 0
COUGH: 0

## 2023-08-23 NOTE — PROGRESS NOTES
[facility])  Family Medicine Outpatient        SUBJECTIVE:  CC: had concerns including Constipation (Patient states he presents to discuss chronic constipation with the provider. States he is taking docusate 2-3 times per week. ) and Medication Adjustment (State she would like to discuss medication dosage adjustments with the provider. ). HPI:  Lisa Martinez is a male 21 y.o. presented to the clinic for a f/u appointment. He is having a bm every other day. He is taking Docusate 2-3x a week and Protonix 40 mg/bid. Saw GI, Dr. Belle Encinas. Planned EGD for 9/19 and follow up with GI is 10/2. He was down to qd on Protonix, but it got worse this last week and bumped it back up. Admits to stress/anxiety. Increasing the Protonix didn't help as much as prior. He has had to use prn Pepto or tums. Request Covid booster. Review of Systems   Constitutional:  Negative for appetite change, fatigue and fever. Respiratory:  Negative for cough, shortness of breath and wheezing. Cardiovascular:  Negative for chest pain and palpitations. Gastrointestinal:  Positive for constipation. Negative for abdominal pain, blood in stool, diarrhea, nausea and vomiting. Gerd   Musculoskeletal:  Positive for neck pain. Negative for gait problem. Outpatient Medications Marked as Taking for the 8/23/23 encounter (Office Visit) with Chioma Brantley MD   Medication Sig Dispense Refill    sucralfate (CARAFATE) 1 GM tablet Take 1 tablet by mouth daily as needed (gerd) 30 tablet 0    busPIRone (BUSPAR) 10 MG tablet TAKE 1 TABLET BY MOUTH IN THE MORNING AND IN THE EVENING 180 tablet 0    pantoprazole (PROTONIX) 40 MG tablet TAKE 1 TABLET BY MOUTH TWICE A DAY 60 tablet 4    docusate sodium (COLACE) 100 MG capsule Take 1 capsule by mouth 2 times daily States he is taking medication 2-3 times weekly.       fluvoxaMINE (LUVOX) 100 MG tablet Take 1 tablet by mouth nightly      guanFACINE HCl ER (INTUNIV) 3 MG TB24 tablet Take 1 tablet by

## 2023-08-30 PROBLEM — S16.1XXA CERVICAL STRAIN: Status: ACTIVE | Noted: 2023-08-30

## 2023-08-30 ASSESSMENT — ENCOUNTER SYMPTOMS: CONSTIPATION: 1

## 2023-09-12 RX ORDER — SENNOSIDES A AND B 8.6 MG/1
1 TABLET, FILM COATED ORAL 2 TIMES DAILY PRN
COMMUNITY

## 2023-09-12 NOTE — PROGRESS NOTES
[address] [place] [address] PRE-ADMISSION TESTING   ENDOSCOPY/ COLONSCOPY INSTRUCTIONS  PAT- Phone Number: [phone]    ENDOSCOPY:    [x] Nothing by mouth after midnight. Including no gum, candy, mints, or water. [x] You may brush your teeth, gargle, but do NOT swallow water. [x] No  lotions, powders, deodorant. [x] Arrange transportation with a responsible adult  to and from the hospital. If you do not have a responsible adult  to transport you, you will need to make arrangements with a medical transportation company. Arrange for someone to be with you for the remainder of the day and for 24 hours after your procedure due to having had anesthesia. -Who will be your  for transportation?___[employer]_______________   -Who will be staying with you for 24 hrs after your procedure? __Chalice________________    PARKING INSTRUCTIONS:     [x] ARRIVAL DATE & TIME: 9/19/23 at 0700  [x] Times are subject to change. We will contact you the business day before surgery if that were to occur. [x] Enter into the [employer]. Two people may accompany you. Masks are not required. [x] Parking Lot \"I\" is where you will park. It is located on the corner of [address] [place] [address] and [address] [facility]. The entrance is on [address] [place] [address]. Only one vehicle - per patient, is permitted in parking lot. Upon entering the parking lot, a voucher ticket will print. MEDICATION INSTRUCTIONS:    [x] Bring a complete list of your medications, please write the last time you took the medicine, give this list to the nurse in Pre-Op. [x] Take only the following medications the morning of surgery with 1-2 ounces of water: buspar, protonix, and luvox. [x] Stop all herbal supplements and vitamins 5 days before surgery. [x] Stop all NSAIDS 7 days before surgery. [] DO NOT take any diabetic medicine the morning of surgery. Follow instructions for insulin the day before surgery.   [] If you are

## 2023-09-14 ENCOUNTER — OFFICE VISIT (OUTPATIENT)
Dept: PRIMARY CARE CLINIC | Age: 18
End: 2023-09-14

## 2023-09-14 VITALS
DIASTOLIC BLOOD PRESSURE: 66 MMHG | WEIGHT: 100 LBS | RESPIRATION RATE: 18 BRPM | OXYGEN SATURATION: 99 % | HEART RATE: 44 BPM | HEIGHT: 65 IN | TEMPERATURE: 98 F | SYSTOLIC BLOOD PRESSURE: 100 MMHG | BODY MASS INDEX: 16.66 KG/M2

## 2023-09-14 DIAGNOSIS — S00.502A: Primary | ICD-10-CM

## 2023-09-14 DIAGNOSIS — H92.01 OTALGIA, RIGHT: ICD-10-CM

## 2023-09-14 NOTE — PROGRESS NOTES
Chief Complaint:   Tongue bite (States he bit his tongue 1 week ago, believes it is infected d/t pain traveling into R ear and down throat. )    History of Present Illness   HPI:  Lui Urban is a 21 y.o. male who presents to [address] [place] [address] today for tongue pain, right ear and sore throat. Patient states approximately 1 week ago he bit his tongue while he was eating affecting the right side of the tongue. States it has been hurting and now is going into his ear and down his throat. He believes it is possibly infected. He denies any trouble swallowing, fever, chills, nausea, vomiting, diarrhea, chest pain or shortness of breath. Prior to Visit Medications    Medication Sig Taking? Authorizing Provider   senna (SENOKOT) 8.6 MG tablet Take 1 tablet by mouth 2 times daily as needed for Constipation Yes Historical Provider, MD   sucralfate (CARAFATE) 1 GM tablet Take 1 tablet by mouth daily as needed (gerd) Yes Delaney Jenkins MD   busPIRone (BUSPAR) 10 MG tablet TAKE 1 TABLET BY MOUTH IN THE MORNING AND IN THE EVENING Yes Delaney Jenkins MD   pantoprazole (PROTONIX) 40 MG tablet TAKE 1 TABLET BY MOUTH TWICE A DAY Yes ARTUR Cohen CNP   docusate sodium (COLACE) 100 MG capsule Take 1 capsule by mouth 2 times daily States he is taking medication 2-3 times weekly.  Yes Historical Provider, MD   polyethylene glycol (GLYCOLAX) 17 g packet Take 1 packet by mouth daily as needed for Constipation Yes Historical Provider, MD   fluvoxaMINE (LUVOX) 100 MG tablet Take 1 tablet by mouth daily Yes Historical Provider, MD   guanFACINE HCl ER (INTUNIV) 3 MG TB24 tablet Take 1 tablet by mouth daily Yes Historical Provider, MD   hydrOXYzine (VISTARIL) 25 MG capsule Take 1 capsule by mouth 3 times daily as needed for Itching Yes Historical Provider, MD       Review of Systems   Unless otherwise stated in this report or unable to obtain because of the patient's clinical or mental status as evidenced by the medical

## 2023-09-19 ENCOUNTER — ANESTHESIA (OUTPATIENT)
Dept: ENDOSCOPY | Age: 18
End: 2023-09-19
Payer: COMMERCIAL

## 2023-09-19 ENCOUNTER — ANESTHESIA EVENT (OUTPATIENT)
Dept: ENDOSCOPY | Age: 18
End: 2023-09-19
Payer: COMMERCIAL

## 2023-09-19 ENCOUNTER — HOSPITAL ENCOUNTER (OUTPATIENT)
Age: 18
Setting detail: OUTPATIENT SURGERY
Discharge: HOME | End: 2023-09-19
Attending: STUDENT IN AN ORGANIZED HEALTH CARE EDUCATION/TRAINING PROGRAM | Admitting: STUDENT IN AN ORGANIZED HEALTH CARE EDUCATION/TRAINING PROGRAM
Payer: COMMERCIAL

## 2023-09-19 VITALS
HEART RATE: 51 BPM | WEIGHT: 98 LBS | RESPIRATION RATE: 18 BRPM | BODY MASS INDEX: 16.73 KG/M2 | SYSTOLIC BLOOD PRESSURE: 127 MMHG | TEMPERATURE: 97 F | HEIGHT: 64 IN | OXYGEN SATURATION: 100 % | DIASTOLIC BLOOD PRESSURE: 79 MMHG

## 2023-09-19 DIAGNOSIS — R11.0 NAUSEA: ICD-10-CM

## 2023-09-19 DIAGNOSIS — R10.9 ABDOMINAL PAIN: ICD-10-CM

## 2023-09-19 PROCEDURE — 2500000003 HC RX 250 WO HCPCS

## 2023-09-19 PROCEDURE — 7100000010 HC PHASE II RECOVERY - FIRST 15 MIN: Performed by: STUDENT IN AN ORGANIZED HEALTH CARE EDUCATION/TRAINING PROGRAM

## 2023-09-19 PROCEDURE — 88305 TISSUE EXAM BY PATHOLOGIST: CPT

## 2023-09-19 PROCEDURE — 3700000000 HC ANESTHESIA ATTENDED CARE: Performed by: STUDENT IN AN ORGANIZED HEALTH CARE EDUCATION/TRAINING PROGRAM

## 2023-09-19 PROCEDURE — 7100000011 HC PHASE II RECOVERY - ADDTL 15 MIN: Performed by: STUDENT IN AN ORGANIZED HEALTH CARE EDUCATION/TRAINING PROGRAM

## 2023-09-19 PROCEDURE — 3700000001 HC ADD 15 MINUTES (ANESTHESIA): Performed by: STUDENT IN AN ORGANIZED HEALTH CARE EDUCATION/TRAINING PROGRAM

## 2023-09-19 PROCEDURE — 6360000002 HC RX W HCPCS

## 2023-09-19 PROCEDURE — 43239 EGD BIOPSY SINGLE/MULTIPLE: CPT | Performed by: STUDENT IN AN ORGANIZED HEALTH CARE EDUCATION/TRAINING PROGRAM

## 2023-09-19 PROCEDURE — 88342 IMHCHEM/IMCYTCHM 1ST ANTB: CPT

## 2023-09-19 PROCEDURE — 3609012400 HC EGD TRANSORAL BIOPSY SINGLE/MULTIPLE: Performed by: STUDENT IN AN ORGANIZED HEALTH CARE EDUCATION/TRAINING PROGRAM

## 2023-09-19 PROCEDURE — 2709999900 HC NON-CHARGEABLE SUPPLY: Performed by: STUDENT IN AN ORGANIZED HEALTH CARE EDUCATION/TRAINING PROGRAM

## 2023-09-19 PROCEDURE — 2580000003 HC RX 258

## 2023-09-19 RX ORDER — SODIUM CHLORIDE 9 MG/ML
INJECTION, SOLUTION INTRAVENOUS PRN
Status: CANCELLED | OUTPATIENT
Start: 2023-09-19

## 2023-09-19 RX ORDER — PROPOFOL 10 MG/ML
INJECTION, EMULSION INTRAVENOUS PRN
Status: DISCONTINUED | OUTPATIENT
Start: 2023-09-19 | End: 2023-09-19 | Stop reason: SDUPTHER

## 2023-09-19 RX ORDER — SODIUM CHLORIDE 0.9 % (FLUSH) 0.9 %
5-40 SYRINGE (ML) INJECTION EVERY 12 HOURS SCHEDULED
Status: CANCELLED | OUTPATIENT
Start: 2023-09-19

## 2023-09-19 RX ORDER — SODIUM CHLORIDE 9 MG/ML
INJECTION, SOLUTION INTRAVENOUS CONTINUOUS PRN
Status: DISCONTINUED | OUTPATIENT
Start: 2023-09-19 | End: 2023-09-19 | Stop reason: SDUPTHER

## 2023-09-19 RX ORDER — GLYCOPYRROLATE 0.2 MG/ML
INJECTION INTRAMUSCULAR; INTRAVENOUS PRN
Status: DISCONTINUED | OUTPATIENT
Start: 2023-09-19 | End: 2023-09-19 | Stop reason: SDUPTHER

## 2023-09-19 RX ORDER — SODIUM CHLORIDE 0.9 % (FLUSH) 0.9 %
5-40 SYRINGE (ML) INJECTION PRN
Status: CANCELLED | OUTPATIENT
Start: 2023-09-19

## 2023-09-19 RX ORDER — LIDOCAINE HYDROCHLORIDE 20 MG/ML
INJECTION, SOLUTION INTRAVENOUS PRN
Status: DISCONTINUED | OUTPATIENT
Start: 2023-09-19 | End: 2023-09-19 | Stop reason: SDUPTHER

## 2023-09-19 RX ORDER — ONDANSETRON 4 MG/1
4 TABLET, ORALLY DISINTEGRATING ORAL EVERY 8 HOURS PRN
Status: CANCELLED | OUTPATIENT
Start: 2023-09-19

## 2023-09-19 RX ORDER — ONDANSETRON 2 MG/ML
4 INJECTION INTRAMUSCULAR; INTRAVENOUS EVERY 6 HOURS PRN
Status: CANCELLED | OUTPATIENT
Start: 2023-09-19

## 2023-09-19 RX ADMIN — SODIUM CHLORIDE: 9 INJECTION, SOLUTION INTRAVENOUS at 08:11

## 2023-09-19 RX ADMIN — GLYCOPYRROLATE 1 MG: 0.2 INJECTION INTRAMUSCULAR; INTRAVENOUS at 08:13

## 2023-09-19 RX ADMIN — PROPOFOL 200 MG: 10 INJECTION, EMULSION INTRAVENOUS at 08:13

## 2023-09-19 RX ADMIN — LIDOCAINE HYDROCHLORIDE 50 MG: 20 INJECTION, SOLUTION INTRAVENOUS at 08:13

## 2023-09-19 ASSESSMENT — LIFESTYLE VARIABLES: SMOKING_STATUS: 0

## 2023-09-19 ASSESSMENT — PAIN - FUNCTIONAL ASSESSMENT: PAIN_FUNCTIONAL_ASSESSMENT: 0-10

## 2023-09-19 NOTE — OP NOTE
Operative Note      Patient: Scooby Kaba  YOB: 2005  MRN: [id number]    Date of Procedure: 9/19/2023    Pre-Op Diagnosis Codes:     * Nausea [R11.0]     * Abdominal pain [R10.9]    Post-Op Diagnosis: Normal exam       Procedure(s):  EGD BIOPSY    Surgeon(s):  Nory Masters MD    Assistant:   * No surgical staff found *    Anesthesia: Monitor Anesthesia Care    Estimated Blood Loss (mL): less than 50     Complications: None    Specimens:   ID Type Source Tests Collected by Time Destination   A : Duodenal Bx R/O Celiac Tissue Stomach SURGICAL PATHOLOGY Nory Masters MD 9/19/2023 0428    B : Antral Bx R/O Hpylori Tissue Stomach SURGICAL PATHOLOGY Nory Masters MD 9/19/2023 6227        Implants:  * No implants in log *      Drains: * No LDAs found *    Indications:  17y/M presents for evaluation of dyspepsia and post-prandial discomfort. Findings:     Normal esophagus. Normal stomach. H pylori biopsies obtained. Food debris in stomach consistent with possible gastroparesis. Normal duodenum. Biopsied. Detailed Description of Procedure:   Pre-Anesthesia Assessment:  Prior to the procedure, a history and physical was performed and patient medications and allergies were reviewed. The risks, benefits, complications, treatment options and expected outcomes were discussed with the patient. The possibilities of reaction to medication, pulmonary aspiration, perforation of the gastrointestinal tract, bleeding requiring transfusion or operation, respiratory failure requiring placement on a ventilator, and failure to diagnose a condition or identify polyps/lesions were discussed with the patient. All questions were answered and informed consent was obtained. Patient identification and proposed procedure were verified by the physician, the nurse, the anesthesiologist, the anesthetist, and the technician in the preprocedure area. Please see accompanying documentation.  All staff in attendance for the performed procedure act in the role of the Chaperone. After I obtained informed consent, the scope was passed under direct vision. Throughout the procedure, the patient's blood pressure, pulse, and oxygen saturations were monitored continuously. The gastroscope was introduced through the mouth and advanced to the duodenum. The procedure was performed without difficulty. The patient tolerated the procedure well. EGD:    The mucosa of the esophagus appeared normal.   No stricture, ulceration, or inflammation was appreciated. The Z-line was regular and found at 38cm. No hiatal hernia was appreciated. Food was appreciated within the stomach. Food content was noted to be fibrous and fatty. The mucosa of the stomach appeared overall normal.  A mild, reactive-appearing gastritis with erythema and granularity was appreciated in the antrum. No erosion or ulceration was appreciated in the body, antrum, or pylorus. Biopsies for H. pylori were obtained. The fundus and cardia were carefully inspected in retroflexion and showed normal mucosa as well as food debris. The mucosa of the duodenum appeared normal.   No stricture, inflammation, erosion, or ulceration was appreciated. Diffuse lymphangiectasia findings were appreciated. Biopsies for celiac disease were obtained. Recommendations:  -The patient will be observed post procedure until all discharge criteria are met. -Patient has a contact number available for emergencies. The signs and symptoms of potential delayed complications were discussed with the patient.    -Return to normal activities tomorrow. -Written discharge instructions were provided to the patient.    -Await pathology results.  -Begin gastroparesis diet with low-fat, low-fiber foods and small, frequent meals.   -Will consider GES as outpatient.   -Clinic appointment scheduled 10/2.     Electronically signed by Alexi Ott MD on 9/19/2023 at 8:24 AM

## 2023-09-19 NOTE — ANESTHESIA POSTPROCEDURE EVALUATION
Department of Anesthesiology  Postprocedure Note    Patient: Brianna Larose  MRN: [id number]  YOB: 2005  Date of evaluation: 9/19/2023      Procedure Summary     Date: 09/19/23 Room / Location: [address] [place] [address] / [facility]    Anesthesia Start: 6403 Anesthesia Stop: 1265    Procedure: EGD BIOPSY Diagnosis:       Nausea      Abdominal pain      (Nausea [R11.0])      (Abdominal pain [R10.9])    Surgeons: Viviana Hooks MD Responsible Provider:     Anesthesia Type: MAC ASA Status: 2          Anesthesia Type: MAC    Vince Phase I: Vince Score: 10    Vince Phase II:        Anesthesia Post Evaluation    Patient location during evaluation: PACU  Patient participation: complete - patient participated  Level of consciousness: awake  Pain score: 3  Airway patency: patent  Nausea & Vomiting: no nausea and no vomiting  Complications: no  Cardiovascular status: blood pressure returned to baseline  Respiratory status: acceptable  Hydration status: euvolemic

## 2023-09-19 NOTE — H&P
[facility] ([facility])   Gastroenterology, Hepatology, &  Advanced Endoscopy    Consult       ASSESSMENT AND PLAN:    17y/M presents for evaluation of post-prandial discomfort. PLAN:  EGD today        HISTORY OF PRESENT ILLNESS:      Demario Haney is a very pleasant 21year old gentleman that presents today for follow up on constipation and abdominal pain     Tells me he is eating \"better\"  Followed up with a nutritionist; now has meal plans  Following the low fodmap diet     The constipation has improved; takes Miralax as needed  Will add Senna on occasion  Tells me today that the abdominal pain presents when he overeats  He is now following recommended servings on the back of packages    Pantoprazole 40 mg daily has satisfied acid reflux and abdominal pain     Patient is scheduled for EGD in the fall     Coughing in the room today  Cannot afford allergy medication    Past Medical History:        Diagnosis Date    ADHD     OCD (obsessive compulsive disorder)     Social anxiety disorder      Past Surgical History:        Procedure Laterality Date    HERNIA REPAIR       Social History:    TOBACCO:   reports that he has never smoked. He has never been exposed to tobacco smoke. He has never used smokeless tobacco.  ETOH:   reports no history of alcohol use. DRUGS:   reports no history of drug use. Family History:       Problem Relation Age of Onset    Other Mother         IBS    Anxiety Disorder Mother     Anxiety Disorder Sister     Autism Sister     Anxiety Disorder Brother     Asthma Brother        No current facility-administered medications for this encounter.     Current Outpatient Medications:     senna (SENOKOT) 8.6 MG tablet, Take 1 tablet by mouth 2 times daily as needed for Constipation, Disp: , Rfl:     sucralfate (CARAFATE) 1 GM tablet, Take 1 tablet by mouth daily as needed (gerd), Disp: 30 tablet, Rfl: 0    busPIRone (BUSPAR) 10 MG tablet, TAKE 1 TABLET BY MOUTH IN THE MORNING AND IN THE EVENING, Disp: 180 tablet, Rfl:

## 2023-09-19 NOTE — DISCHARGE INSTRUCTIONS
Recommendations:  -The patient will be observed post procedure until all discharge criteria are met. -Patient has a contact number available for emergencies. The signs and symptoms of potential delayed complications were discussed with the patient.    -Return to normal activities tomorrow. -Written discharge instructions were provided to the patient.    -Await pathology results.  -Begin gastroparesis diet with low-fat, low-fiber foods and small, frequent meals.   -Will consider GES as outpatient.   -Clinic appointment scheduled 10/2.

## 2023-09-22 ENCOUNTER — OFFICE VISIT (OUTPATIENT)
Age: 18
End: 2023-09-22
Payer: COMMERCIAL

## 2023-09-22 VITALS
TEMPERATURE: 97.7 F | SYSTOLIC BLOOD PRESSURE: 94 MMHG | WEIGHT: 103.8 LBS | HEIGHT: 64 IN | BODY MASS INDEX: 17.72 KG/M2 | DIASTOLIC BLOOD PRESSURE: 58 MMHG | RESPIRATION RATE: 16 BRPM | HEART RATE: 67 BPM | OXYGEN SATURATION: 99 %

## 2023-09-22 DIAGNOSIS — K31.84 GASTROPARESIS: ICD-10-CM

## 2023-09-22 DIAGNOSIS — K59.00 CONSTIPATION, UNSPECIFIED CONSTIPATION TYPE: Primary | ICD-10-CM

## 2023-09-22 PROCEDURE — 1036F TOBACCO NON-USER: CPT | Performed by: NURSE PRACTITIONER

## 2023-09-22 PROCEDURE — G8419 CALC BMI OUT NRM PARAM NOF/U: HCPCS | Performed by: NURSE PRACTITIONER

## 2023-09-22 PROCEDURE — 99212 OFFICE O/P EST SF 10 MIN: CPT | Performed by: NURSE PRACTITIONER

## 2023-09-22 PROCEDURE — G8427 DOCREV CUR MEDS BY ELIG CLIN: HCPCS | Performed by: NURSE PRACTITIONER

## 2023-09-22 RX ORDER — GUANFACINE 2 MG/1
TABLET, EXTENDED RELEASE ORAL
COMMUNITY
Start: 2023-08-30

## 2023-09-22 RX ORDER — HYDROXYZINE HYDROCHLORIDE 10 MG/1
TABLET, FILM COATED ORAL
COMMUNITY
Start: 2023-08-30

## 2023-09-22 RX ORDER — ATOMOXETINE 25 MG/1
CAPSULE ORAL
COMMUNITY
Start: 2023-09-20

## 2023-09-25 LAB — SURGICAL PATHOLOGY REPORT: NORMAL

## 2023-09-28 ENCOUNTER — HOSPITAL ENCOUNTER (OUTPATIENT)
Dept: NUCLEAR MEDICINE | Age: 18
Discharge: HOME | End: 2023-09-30
Payer: COMMERCIAL

## 2023-09-28 DIAGNOSIS — K31.84 GASTROPARESIS: ICD-10-CM

## 2023-09-28 PROCEDURE — A9541 TC99M SULFUR COLLOID: HCPCS | Performed by: RADIOLOGY

## 2023-09-28 PROCEDURE — 78264 GASTRIC EMPTYING IMG STUDY: CPT

## 2023-09-28 PROCEDURE — 3430000000 HC RX DIAGNOSTIC RADIOPHARMACEUTICAL: Performed by: RADIOLOGY

## 2023-09-28 RX ADMIN — Medication 1 MILLICURIE: at 09:37

## 2023-10-03 ENCOUNTER — CLINICAL DOCUMENTATION (OUTPATIENT)
Dept: NUTRITION | Age: 18
End: 2023-10-03

## 2023-10-03 NOTE — PROGRESS NOTES
Nutrition Follow Up Note    Date: 10/3/2023  Patient: Judy Watts  Referring Clinician: Dorys Emery MD  Fax: [phone]  Patient's Last Session: 7/27/23  Reason for Visit: gastroparesis    Progress with SMART goals: Will drink at least 2 carnation instant breakfast drinks per day or alternative protein shake - (has not tried yet)    Current eating pattern:   24 hr recall:    Cereal (monster mash)  3 mandarin oranges  Ham, bethany crackers  Dinner: 6 pieces popcorn chicken, 1 1/2 cup mashed potatoes, 2-3 T cream corn   Water 4 16 oz. Glasses    Reports bloating/gassy, but attributes it to linzess, which is a new medication    Last visit weight: 101.3# 7/2023  CBW: 103# 9/22/23  Height: 5'4.75\"    Handouts given:   Gastroparesis nutrition therapy (emailed to patient)    Notes:   Patient reports bloating/stomach pain after eating. He states he recently had an EGD done and was diagnosed with gastroparesis. Patient states he was instructed to follow a low fat/low fiber diet and was given diet instructions from the [address] [place] [address]. We reviewed the nutrition therapy guidelines for gastroparesis and reviewed foods recommended/foods to avoid. We discussed portion sizes and tips to eat small, frequent meals. Encouraged patient to stop following a low fodmap diet, but to continue avoiding foods that are known triggers. Patient states he continues to deal with roommate issues, but now is living with his parents. He reports ongoing stress, but is going to work on his diet. Encouraged patient to keep a food journal so he can identify any further foods that trigger symptoms and to make sure he is eating enough throughout the day as he continues to struggle with gaining weight. Weight appears to be relatively stable since last visit about 2 months ago, however patient states he weighed 98# at one point. Patient states he would like to start eating eggs & toast in the morning.  Again, encouraged patient to try

## 2023-10-20 ENCOUNTER — TELEPHONE (OUTPATIENT)
Dept: SURGERY | Age: 18
End: 2023-10-20

## 2023-10-20 ENCOUNTER — OFFICE VISIT (OUTPATIENT)
Dept: SURGERY | Age: 18
End: 2023-10-20
Payer: COMMERCIAL

## 2023-10-20 VITALS
HEIGHT: 64 IN | RESPIRATION RATE: 16 BRPM | BODY MASS INDEX: 17.07 KG/M2 | HEART RATE: 81 BPM | WEIGHT: 100 LBS | TEMPERATURE: 98.5 F | DIASTOLIC BLOOD PRESSURE: 85 MMHG | SYSTOLIC BLOOD PRESSURE: 120 MMHG

## 2023-10-20 DIAGNOSIS — L05.91 PILONIDAL CYST: Primary | ICD-10-CM

## 2023-10-20 DIAGNOSIS — L05.91 PILONIDAL CYST: ICD-10-CM

## 2023-10-20 PROCEDURE — G8427 DOCREV CUR MEDS BY ELIG CLIN: HCPCS | Performed by: SURGERY

## 2023-10-20 PROCEDURE — G8419 CALC BMI OUT NRM PARAM NOF/U: HCPCS | Performed by: SURGERY

## 2023-10-20 PROCEDURE — 99204 OFFICE O/P NEW MOD 45 MIN: CPT | Performed by: SURGERY

## 2023-10-20 PROCEDURE — G8484 FLU IMMUNIZE NO ADMIN: HCPCS | Performed by: SURGERY

## 2023-10-20 PROCEDURE — 1036F TOBACCO NON-USER: CPT | Performed by: SURGERY

## 2023-10-20 RX ORDER — CIPROFLOXACIN 500 MG/1
500 TABLET, FILM COATED ORAL 2 TIMES DAILY
Qty: 28 TABLET | Refills: 0 | Status: SHIPPED | OUTPATIENT
Start: 2023-10-20

## 2023-10-20 RX ORDER — SODIUM CHLORIDE 9 MG/ML
INJECTION, SOLUTION INTRAVENOUS CONTINUOUS
OUTPATIENT
Start: 2023-10-20

## 2023-10-20 RX ORDER — SODIUM CHLORIDE 0.9 % (FLUSH) 0.9 %
5-40 SYRINGE (ML) INJECTION EVERY 12 HOURS SCHEDULED
OUTPATIENT
Start: 2023-10-20

## 2023-10-20 RX ORDER — DOXYCYCLINE HYCLATE 100 MG/1
CAPSULE ORAL
COMMUNITY
Start: 2023-10-13

## 2023-10-20 RX ORDER — SODIUM CHLORIDE 0.9 % (FLUSH) 0.9 %
5-40 SYRINGE (ML) INJECTION PRN
OUTPATIENT
Start: 2023-10-20

## 2023-10-20 RX ORDER — SODIUM CHLORIDE 9 MG/ML
INJECTION, SOLUTION INTRAVENOUS PRN
OUTPATIENT
Start: 2023-10-20

## 2023-10-20 NOTE — TELEPHONE ENCOUNTER
Per Dr. Justin Mcginnis, patient is scheduled for Excision pilonidal cyst at [facility] on 23. Surgery scheduled via [facility], surgeon's calendar updated. Dr. Justin Mcginnis to enter orders. Follow up appointment scheduled. Electronically signed by Milly Davison RN on 10/20/2023 at 11:27 AM    Prior Authorization Form:      DEMOGRAPHICS:                     Patient Name:  Eduard Avery  Patient :  2005            Insurance:  Payor: Albertina Rocha / Plan: [employer] / Product Type: *No Product type* /   Insurance ID Number:    Payer/Plan Subscr  Sex Relation Sub.  Ins. ID Effective Group Num   1. [employer] Chapa Neigh [birth year] Male Self [id number] [address] [facility] [address]         DIAGNOSIS & PROCEDURE:                       Procedure/Operation: Excision pilonidal cyst           CPT Code: 89640    Diagnosis:  pilonidal cyst    ICD10 Code: L05.91    Location:  [facility]    Surgeon:  Lucas Kim INFORMATION:                          Date: 23    Time: TBD              Anesthesia:  MAC/TIVA                                                       Status:  Outpatient        Special Comments:         Electronically signed by Milly Davison RN on 10/20/2023 at 11:27 AM

## 2023-10-20 NOTE — PROGRESS NOTES
Kristi Adrian  10/20/2023      [facility] Office Consult    CHIEF COMPLAINT:  pilonidal cyst abscess    HISTORY OF PRESENT ILLNESS:  Kristi Adrian is a 21 y.o.  male with a painful pilonidal cyst abscess. He had it drained 9/2022 and it healed. Now it is painful and swollen again, started on Doxycycline 5 days ago. Less painful, no drainage, no cellulitis. Past Medical History: He has a past medical history of ADHD, OCD (obsessive compulsive disorder), and Social anxiety disorder. Past Surgical History: He has a past surgical history that includes hernia repair (2009) and Upper gastrointestinal endoscopy (N/A, 09/19/2023). Home Medications  Prior to Visit Medications    Medication Sig Taking?  Authorizing Provider   doxycycline hyclate (VIBRAMYCIN) 100 MG capsule TAKE 1 CAPSULE BY MOUTH TWICE A DAY FOR 10 DAYS WITH FOOD Yes Madeleine Last MD   atomoxetine (STRATTERA) 25 MG capsule  Yes Madeleine Last MD   hydrOXYzine HCl (ATARAX) 10 MG tablet TAKE 1 (ONE) TABLET DAILY AS NEEDED FOR ANXIETY Yes Madeleine Last MD   linaclotide (LINZESS) 145 MCG capsule Take 1 capsule by mouth every morning (before breakfast) Yes ARTUR Benedict CNP   senna (SENOKOT) 8.6 MG tablet Take 1 tablet by mouth 2 times daily as needed for Constipation Yes Madeleine Last MD   busPIRone (BUSPAR) 10 MG tablet TAKE 1 TABLET BY MOUTH IN THE MORNING AND IN THE EVENING Yes Thuy Orozco MD   pantoprazole (PROTONIX) 40 MG tablet TAKE 1 TABLET BY MOUTH TWICE A DAY Yes Twila Kurtz APRN - CNP   polyethylene glycol (GLYCOLAX) 17 g packet Take 1 packet by mouth daily as needed for Constipation Yes Madeleine Last MD   fluvoxaMINE (LUVOX) 100 MG tablet Take 1 tablet by mouth daily Yes Madeleine Last MD   guanFACINE (INTUNIV) 2 MG TB24 extended release tablet TAKE 2 TABLETS BY MOUTH EVERY DAY AT BEDTIME  Patient not taking: Reported on 10/20/2023  Madeleine Last MD   sucralfate

## 2023-10-23 ENCOUNTER — OFFICE VISIT (OUTPATIENT)
Age: 18
End: 2023-10-23
Payer: COMMERCIAL

## 2023-10-23 VITALS
WEIGHT: 100.4 LBS | TEMPERATURE: 97.7 F | BODY MASS INDEX: 17.14 KG/M2 | SYSTOLIC BLOOD PRESSURE: 102 MMHG | OXYGEN SATURATION: 98 % | HEART RATE: 70 BPM | DIASTOLIC BLOOD PRESSURE: 68 MMHG | HEIGHT: 64 IN | RESPIRATION RATE: 16 BRPM

## 2023-10-23 DIAGNOSIS — K21.9 GASTRO-ESOPHAGEAL REFLUX DISEASE WITHOUT ESOPHAGITIS: ICD-10-CM

## 2023-10-23 DIAGNOSIS — K59.00 CONSTIPATION, UNSPECIFIED CONSTIPATION TYPE: Primary | ICD-10-CM

## 2023-10-23 PROCEDURE — G8484 FLU IMMUNIZE NO ADMIN: HCPCS | Performed by: NURSE PRACTITIONER

## 2023-10-23 PROCEDURE — G8427 DOCREV CUR MEDS BY ELIG CLIN: HCPCS | Performed by: NURSE PRACTITIONER

## 2023-10-23 PROCEDURE — 99212 OFFICE O/P EST SF 10 MIN: CPT | Performed by: NURSE PRACTITIONER

## 2023-10-23 PROCEDURE — G8419 CALC BMI OUT NRM PARAM NOF/U: HCPCS | Performed by: NURSE PRACTITIONER

## 2023-10-23 PROCEDURE — 1036F TOBACCO NON-USER: CPT | Performed by: NURSE PRACTITIONER

## 2023-10-23 RX ORDER — SENNOSIDES A AND B 8.6 MG/1
1 TABLET, FILM COATED ORAL 2 TIMES DAILY PRN
Qty: 30 TABLET | Refills: 5 | Status: SHIPPED | OUTPATIENT
Start: 2023-10-23

## 2023-10-23 NOTE — PATIENT INSTRUCTIONS
On a day when you are home all day, go over the counter and purchase a medium sized bottle of Miralax and a 64 ounce Gatorade. Mix the entire bottle of Miralax in the Gatorade and drink it throughout the day. MAKE SURE TO PUSH FLUIDS!!      Afterwards, continue stool softeners daily as directed

## 2023-11-06 NOTE — PROGRESS NOTES
[facility]                                                                                                                    PRE OP INSTRUCTIONS FOR  Jaren Mills        Date: 11/6/2023    Date of surgery: 11/7/23   Arrival Time: Hospital will call you between 5pm and 7pm with your final arrival time for surgery    Do not eat or drink anything after midnight prior to surgery. This includes no water, chewing gum, mints or ice chips. Take the following medications with a small sip of water on the morning of Surgery: Buspar, Luvoxx, Pantoprazole, Hydroxyzine prn     Diabetics may take evening dose of insulin but none after midnight. If you feel symptomatic or low blood sugar morning of surgery drink 1-2 ounces of apple juice only. Aspirin, Ibuprofen, Advil, Naproxen, Vitamin E and other Anti-inflammatory products should be stopped  before surgery  as directed by your physician. Take Tylenol only unless instructed otherwise by your surgeon. Check with your Doctor regarding stopping Plavix, Coumadin, Lovenox, Eliquis, Effient, or other blood thinners. Do not smoke,use illicit drugs and do not drink any alcoholic beverages 24 hours prior to surgery. You may brush your teeth the morning of surgery. DO NOT SWALLOW WATER    You MUST make arrangements for a responsible adult to take you home after your surgery. You will not be allowed to leave alone or drive yourself home. It is strongly suggested someone stay with you the first 24 hrs. Your surgery will be cancelled if you do not have a ride home. PEDIATRIC PATIENTS ONLY:  A parent/legal guardian must accompany a child scheduled for surgery and plan to stay at the hospital until the child is discharged. Please do not bring other children with you.     Please wear simple, loose fitting clothing to the hospital.  Neeta Jules not bring valuables (money, credit cards, checkbooks, etc.) Do not wear any makeup (including no eye makeup) or nail polish on your fingers or toes. DO NOT wear any jewelry or piercings on day of surgery. All body piercing jewelry must be removed. Shower the night before surgery with _x__Antibacterial soap /ADDIE WIPES________    TOTAL JOINT REPLACEMENT/HYSTERECTOMY PATIENTS ONLY---Remember to bring Blood Bank bracelet to the hospital on the day of surgery. If you have a Living Will and Durable Power of  for Healthcare, please bring in a copy. If appropriate bring crutches, inspirex, WALKER, CANE etc... Notify your Surgeon if you develop any illness between now and surgery time, cough, cold, fever, sore throat, nausea, vomiting, etc.  Please notify your surgeon if you experience dizziness, shortness of breath or blurred vision between now & the time of your surgery. If you have ___dentures, they will be removed before going to the OR; we will provide you a container. If you wear ___contact lenses or _x__glasses, they will be removed; please bring a case for them. To provide excellent care visitors will be limited to 2 in the room at any given time. Please bring picture ID and insurance card. Sleep apnea patients need to bring CPAP AND SETTINGS to hospital on day of surgery. During flu season no children under the age of 15 are permitted in the hospital for the safety of all patients. Other                   Please call AMBULATORY CARE if you have any further questions.    [address]

## 2023-11-07 ENCOUNTER — ANESTHESIA (OUTPATIENT)
Dept: OPERATING ROOM | Age: 18
End: 2023-11-07
Payer: COMMERCIAL

## 2023-11-07 ENCOUNTER — ANESTHESIA EVENT (OUTPATIENT)
Dept: OPERATING ROOM | Age: 18
End: 2023-11-07
Payer: COMMERCIAL

## 2023-11-07 ENCOUNTER — HOSPITAL ENCOUNTER (OUTPATIENT)
Age: 18
Setting detail: OUTPATIENT SURGERY
Discharge: HOME | End: 2023-11-07
Attending: SURGERY | Admitting: SURGERY
Payer: COMMERCIAL

## 2023-11-07 VITALS
HEART RATE: 86 BPM | BODY MASS INDEX: 17.24 KG/M2 | OXYGEN SATURATION: 99 % | WEIGHT: 101 LBS | HEIGHT: 64 IN | DIASTOLIC BLOOD PRESSURE: 56 MMHG | SYSTOLIC BLOOD PRESSURE: 99 MMHG | RESPIRATION RATE: 16 BRPM | TEMPERATURE: 98 F

## 2023-11-07 DIAGNOSIS — L05.91 PILONIDAL CYST: ICD-10-CM

## 2023-11-07 PROCEDURE — 2580000003 HC RX 258: Performed by: SURGERY

## 2023-11-07 PROCEDURE — 6360000002 HC RX W HCPCS: Performed by: NURSE ANESTHETIST, CERTIFIED REGISTERED

## 2023-11-07 PROCEDURE — 2709999900 HC NON-CHARGEABLE SUPPLY: Performed by: SURGERY

## 2023-11-07 PROCEDURE — 3700000000 HC ANESTHESIA ATTENDED CARE: Performed by: SURGERY

## 2023-11-07 PROCEDURE — 3600000002 HC SURGERY LEVEL 2 BASE: Performed by: SURGERY

## 2023-11-07 PROCEDURE — 11771 EXC PILONIDAL CYST XTNSV: CPT | Performed by: SURGERY

## 2023-11-07 PROCEDURE — 6360000002 HC RX W HCPCS: Performed by: SURGERY

## 2023-11-07 PROCEDURE — 2500000003 HC RX 250 WO HCPCS: Performed by: NURSE ANESTHETIST, CERTIFIED REGISTERED

## 2023-11-07 PROCEDURE — 3600000012 HC SURGERY LEVEL 2 ADDTL 15MIN: Performed by: SURGERY

## 2023-11-07 PROCEDURE — 6370000000 HC RX 637 (ALT 250 FOR IP): Performed by: SURGERY

## 2023-11-07 PROCEDURE — 3700000001 HC ADD 15 MINUTES (ANESTHESIA): Performed by: SURGERY

## 2023-11-07 PROCEDURE — 7100000010 HC PHASE II RECOVERY - FIRST 15 MIN: Performed by: SURGERY

## 2023-11-07 PROCEDURE — 2580000003 HC RX 258: Performed by: NURSE ANESTHETIST, CERTIFIED REGISTERED

## 2023-11-07 PROCEDURE — 7100000011 HC PHASE II RECOVERY - ADDTL 15 MIN: Performed by: SURGERY

## 2023-11-07 PROCEDURE — 88304 TISSUE EXAM BY PATHOLOGIST: CPT

## 2023-11-07 RX ORDER — NAPROXEN 250 MG/1
250 TABLET ORAL 2 TIMES DAILY WITH MEALS
Qty: 6 TABLET | Refills: 0 | Status: SHIPPED | OUTPATIENT
Start: 2023-11-07 | End: 2023-11-10

## 2023-11-07 RX ORDER — FENTANYL CITRATE 50 UG/ML
INJECTION, SOLUTION INTRAMUSCULAR; INTRAVENOUS PRN
Status: DISCONTINUED | OUTPATIENT
Start: 2023-11-07 | End: 2023-11-07 | Stop reason: SDUPTHER

## 2023-11-07 RX ORDER — SODIUM CHLORIDE 0.9 % (FLUSH) 0.9 %
5-40 SYRINGE (ML) INJECTION EVERY 12 HOURS SCHEDULED
Status: DISCONTINUED | OUTPATIENT
Start: 2023-11-07 | End: 2023-11-07 | Stop reason: HOSPADM

## 2023-11-07 RX ORDER — SODIUM CHLORIDE 9 MG/ML
INJECTION, SOLUTION INTRAVENOUS PRN
Status: DISCONTINUED | OUTPATIENT
Start: 2023-11-07 | End: 2023-11-07 | Stop reason: HOSPADM

## 2023-11-07 RX ORDER — LIDOCAINE HYDROCHLORIDE 20 MG/ML
INJECTION, SOLUTION INTRAVENOUS PRN
Status: DISCONTINUED | OUTPATIENT
Start: 2023-11-07 | End: 2023-11-07 | Stop reason: SDUPTHER

## 2023-11-07 RX ORDER — SODIUM CHLORIDE 9 MG/ML
INJECTION, SOLUTION INTRAVENOUS CONTINUOUS PRN
Status: DISCONTINUED | OUTPATIENT
Start: 2023-11-07 | End: 2023-11-07 | Stop reason: SDUPTHER

## 2023-11-07 RX ORDER — SODIUM CHLORIDE 0.9 % (FLUSH) 0.9 %
5-40 SYRINGE (ML) INJECTION PRN
Status: DISCONTINUED | OUTPATIENT
Start: 2023-11-07 | End: 2023-11-07 | Stop reason: HOSPADM

## 2023-11-07 RX ORDER — SODIUM CHLORIDE 9 MG/ML
INJECTION, SOLUTION INTRAVENOUS CONTINUOUS
Status: DISCONTINUED | OUTPATIENT
Start: 2023-11-07 | End: 2023-11-07 | Stop reason: HOSPADM

## 2023-11-07 RX ORDER — HYDROMORPHONE HYDROCHLORIDE 1 MG/ML
0.5 INJECTION, SOLUTION INTRAMUSCULAR; INTRAVENOUS; SUBCUTANEOUS EVERY 5 MIN PRN
Status: DISCONTINUED | OUTPATIENT
Start: 2023-11-07 | End: 2023-11-07 | Stop reason: HOSPADM

## 2023-11-07 RX ORDER — METHOCARBAMOL 100 MG/ML
1000 INJECTION, SOLUTION INTRAMUSCULAR; INTRAVENOUS ONCE
Status: DISCONTINUED | OUTPATIENT
Start: 2023-11-07 | End: 2023-11-07 | Stop reason: HOSPADM

## 2023-11-07 RX ORDER — PROCHLORPERAZINE EDISYLATE 5 MG/ML
5 INJECTION INTRAMUSCULAR; INTRAVENOUS
Status: DISCONTINUED | OUTPATIENT
Start: 2023-11-07 | End: 2023-11-07 | Stop reason: HOSPADM

## 2023-11-07 RX ORDER — ACETAMINOPHEN 500 MG
500 TABLET ORAL 4 TIMES DAILY
Qty: 12 TABLET | Refills: 0 | Status: SHIPPED | OUTPATIENT
Start: 2023-11-07 | End: 2023-11-10

## 2023-11-07 RX ORDER — GINSENG 100 MG
CAPSULE ORAL PRN
Status: DISCONTINUED | OUTPATIENT
Start: 2023-11-07 | End: 2023-11-07 | Stop reason: ALTCHOICE

## 2023-11-07 RX ORDER — MIDAZOLAM HYDROCHLORIDE 1 MG/ML
INJECTION INTRAMUSCULAR; INTRAVENOUS PRN
Status: DISCONTINUED | OUTPATIENT
Start: 2023-11-07 | End: 2023-11-07 | Stop reason: SDUPTHER

## 2023-11-07 RX ORDER — GLYCOPYRROLATE 1 MG/5 ML
SYRINGE (ML) INTRAVENOUS PRN
Status: DISCONTINUED | OUTPATIENT
Start: 2023-11-07 | End: 2023-11-07 | Stop reason: SDUPTHER

## 2023-11-07 RX ORDER — PROPOFOL 10 MG/ML
INJECTION, EMULSION INTRAVENOUS CONTINUOUS PRN
Status: DISCONTINUED | OUTPATIENT
Start: 2023-11-07 | End: 2023-11-07 | Stop reason: SDUPTHER

## 2023-11-07 RX ORDER — HYDROMORPHONE HYDROCHLORIDE 1 MG/ML
0.25 INJECTION, SOLUTION INTRAMUSCULAR; INTRAVENOUS; SUBCUTANEOUS EVERY 5 MIN PRN
Status: DISCONTINUED | OUTPATIENT
Start: 2023-11-07 | End: 2023-11-07 | Stop reason: HOSPADM

## 2023-11-07 RX ORDER — BUPIVACAINE HYDROCHLORIDE 2.5 MG/ML
INJECTION, SOLUTION EPIDURAL; INFILTRATION; INTRACAUDAL PRN
Status: DISCONTINUED | OUTPATIENT
Start: 2023-11-07 | End: 2023-11-07 | Stop reason: ALTCHOICE

## 2023-11-07 RX ADMIN — LIDOCAINE HYDROCHLORIDE 60 MG: 20 INJECTION, SOLUTION INTRAVENOUS at 08:51

## 2023-11-07 RX ADMIN — SODIUM CHLORIDE: 9 INJECTION, SOLUTION INTRAVENOUS at 07:53

## 2023-11-07 RX ADMIN — FENTANYL CITRATE 25 MCG: 50 INJECTION, SOLUTION INTRAMUSCULAR; INTRAVENOUS at 08:56

## 2023-11-07 RX ADMIN — Medication 0.2 MG: at 08:42

## 2023-11-07 RX ADMIN — FENTANYL CITRATE 25 MCG: 50 INJECTION, SOLUTION INTRAMUSCULAR; INTRAVENOUS at 08:51

## 2023-11-07 RX ADMIN — PROPOFOL INJECTABLE EMULSION 150 MCG/KG/MIN: 10 INJECTION, EMULSION INTRAVENOUS at 08:51

## 2023-11-07 RX ADMIN — PROPOFOL INJECTABLE EMULSION 40 MG: 10 INJECTION, EMULSION INTRAVENOUS at 08:52

## 2023-11-07 RX ADMIN — FENTANYL CITRATE 50 MCG: 50 INJECTION, SOLUTION INTRAMUSCULAR; INTRAVENOUS at 08:42

## 2023-11-07 RX ADMIN — MIDAZOLAM 2 MG: 1 INJECTION INTRAMUSCULAR; INTRAVENOUS at 08:42

## 2023-11-07 RX ADMIN — WATER 1000 MG: 1 INJECTION INTRAMUSCULAR; INTRAVENOUS; SUBCUTANEOUS at 08:54

## 2023-11-07 RX ADMIN — SODIUM CHLORIDE: 900 INJECTION, SOLUTION INTRAVENOUS at 08:42

## 2023-11-07 ASSESSMENT — LIFESTYLE VARIABLES: SMOKING_STATUS: 0

## 2023-11-07 ASSESSMENT — PAIN - FUNCTIONAL ASSESSMENT: PAIN_FUNCTIONAL_ASSESSMENT: 0-10

## 2023-11-07 NOTE — PROGRESS NOTES
Pt and family had multiple questions re: surgery. Surgical resident called, came and spoke with pt and family. All questions answered.   Virginia Cormier, RN  [id number]  11/7/2023

## 2023-11-07 NOTE — DISCHARGE INSTRUCTIONS
Dr. Rekha Reyes  Discharge Instructions for    Pilonidal cyst excision       Home Care    Place ice on the incision to decrease the pain and bruising. Keep the incision clean and covered. Clean sutures with peroxide, cover with Neosporin and dry dressing daily. Diet    You will be started on a clear-liquid diet after surgery and advanced to a regular diet, depending on your progress and tolerance. You may notice increased gas or changes in your bowel habits during the first month after surgery. Keep the bowels soft with Metamucil, bran cereal, stool softeners or laxatives as needed. Physical Activity    Walk frequently to prevent blood clots in the legs, but do not do anything that causes pain in the incisions. Breath deeply every hour to prevent pneumonia. Medications    Restart blood thinners in 24 hours if no sign of bleeding  Take Ibuprofen or Aleve with Tylenol for pain. Follow-up   Schedule a follow-up appointment for 2 weeks for suture removal.    Call Your Doctor If Any of the Following Occurs     Signs of infection, including fever and chills   Redness, swelling, increasing pain, excessive bleeding, or discharge at the incision site   Cough, shortness of breath, chest pain   Increased abdominal pain   Nausea and/or vomiting that does not resolve off narcotics. Pain, burning, urgency or frequency of urination, or blood in the urine   Pain and/or swelling in your feet, calves, or legs   Dark urine, light stools, or evidence of jaundice (yellowing of the skin or eyes). In case of an emergency,  CALL 911  immediately.

## 2023-11-07 NOTE — OP NOTE
Mirta Wu      DATE OF PROCEDURE: 11/7/2023  SURGEON: Dr. Andry Sheth MD, M.D. Resident: Corby Bowling DO   PREOPERATIVE DIAGNOSIS: Pilonidal cyst (L05.01)  POSTOPERATIVE DIAGNOSIS: Same. OPERATION:  Excision of  Pilonidal cyst (58353)  ANESTHESIA: General   ESTIMATED BLOOD LOSS: None. SPECIMEN: pilonidal cyst  FINDINGS: several sinus tracts with cyst extending to the left of midline. HISTORY: J Carlos Boyle is a  19 y.o.  male who has a pilonidal cyst which improved with antibiotics. We discussed the extensive risks involved in the surgery including the risk of bleeding, infection, and needing further procedures. We also discussed wound infections and the risks of general anesthetic including MI, CVA, sudden death or reactions to anesthetic medications. The patient understood the risks. All questions were answered to the patient's satisfaction and they freely signed the consent and wished to proceed. OPERATION: The patient was placed on the table in the [facility] prone position. He was given Ancef 1 g IV preop. The skin was prepped with Betadine, draped, numbed with marcaine plus epinephrine. A curvilinear incision was made around the area of the cyst. Cautery was used to dissect down and around the cyst which was freed from the fascia. The wound was well irrigated, small bleeders were controlled with cautery. The dead space was closed with 2-0 vicryl running sutures. The skin was closed with 3-0 Prolene running vertical mattress sutures. Neosporin and dry 4x4 dressings was applied. The needle and sponge count were correct. The patient tolerated the procedure well and went to recovery in stable condition.     Physician Signature: Electronically signed by Dr. Andry Sheth MD, M.D.    PCP: Alin Parson MD

## 2023-11-07 NOTE — ANESTHESIA POSTPROCEDURE EVALUATION
Department of Anesthesiology  Postprocedure Note    Patient: Ksenia Theodore  MRN: [id number]  YOB: 2005  Date of evaluation: 11/7/2023      Procedure Summary     Date: 11/07/23 Room / Location: [address] [place] [address] / [facility] Laurie Brown    Anesthesia Start: 0747 Anesthesia Stop: 1902    Procedure: EXCISION PILONIDAL CYST [65309] (Back) Diagnosis:       Pilonidal cyst      (Pilonidal cyst [U44.38])    Surgeons: Jimi Portillo MD Responsible Provider: Katty Floyd MD    Anesthesia Type: MAC ASA Status: 2          Anesthesia Type: No value filed.     Vince Phase I: Vince Score: 10    Vince Phase II: Vince Score: 10      Anesthesia Post Evaluation    Patient location during evaluation: PACU  Patient participation: complete - patient participated  Level of consciousness: awake and alert  Airway patency: patent  Nausea & Vomiting: no nausea and no vomiting  Complications: no  Cardiovascular status: hemodynamically stable  Respiratory status: acceptable  Hydration status: euvolemic  Pain management: satisfactory to patient

## 2023-11-08 ENCOUNTER — CLINICAL DOCUMENTATION (OUTPATIENT)
Dept: NUTRITION | Age: 18
End: 2023-11-08

## 2023-11-08 NOTE — PROGRESS NOTES
Patient No Show      Scheduled Date: 11/8/2023  Patient: Avel Arenas  Referring Clinician: Vicki Tsang MD  Fax: [phone]      Dear Vicki Tsang MD,    Thank you for referring Valeria Eaton to [facility] for outpatient nutrition counseling. Valeria Eaton did not keep scheduled appointment on 11/8/2023. If I can be of further assistance with this patient, please contact me.      Thank you,      Messi Anglin MS, RD, LD  Outpatient Dietitian   Phone: [phone]  Fax: [phone]

## 2023-11-09 LAB — SURGICAL PATHOLOGY REPORT: NORMAL

## 2023-11-24 ENCOUNTER — HOSPITAL ENCOUNTER (EMERGENCY)
Age: 18
Discharge: HOME | End: 2023-11-24
Attending: STUDENT IN AN ORGANIZED HEALTH CARE EDUCATION/TRAINING PROGRAM
Payer: COMMERCIAL

## 2023-11-24 VITALS
RESPIRATION RATE: 14 BRPM | HEART RATE: 86 BPM | SYSTOLIC BLOOD PRESSURE: 118 MMHG | OXYGEN SATURATION: 98 % | TEMPERATURE: 98 F | DIASTOLIC BLOOD PRESSURE: 78 MMHG

## 2023-11-24 DIAGNOSIS — Z48.89 ENCOUNTER FOR POST SURGICAL WOUND CHECK: Primary | ICD-10-CM

## 2023-11-24 DIAGNOSIS — F41.9 ANXIETY: ICD-10-CM

## 2023-11-24 PROCEDURE — 99283 EMERGENCY DEPT VISIT LOW MDM: CPT

## 2023-11-24 RX ORDER — DOXYCYCLINE HYCLATE 100 MG
100 TABLET ORAL 2 TIMES DAILY
Qty: 14 TABLET | Refills: 0 | Status: SHIPPED | OUTPATIENT
Start: 2023-11-24 | End: 2023-12-01

## 2023-11-24 RX ORDER — CEFDINIR 300 MG/1
300 CAPSULE ORAL 2 TIMES DAILY
Qty: 14 CAPSULE | Refills: 0 | Status: SHIPPED | OUTPATIENT
Start: 2023-11-24 | End: 2023-12-01

## 2023-11-24 ASSESSMENT — ENCOUNTER SYMPTOMS
EYE DISCHARGE: 0
SHORTNESS OF BREATH: 0
COUGH: 0
VOMITING: 0
WHEEZING: 0
ABDOMINAL PAIN: 0
DIARRHEA: 0
EYE PAIN: 0
SORE THROAT: 0
EYE REDNESS: 0
BACK PAIN: 0
SINUS PRESSURE: 0
NAUSEA: 0

## 2023-11-24 ASSESSMENT — PAIN - FUNCTIONAL ASSESSMENT: PAIN_FUNCTIONAL_ASSESSMENT: NONE - DENIES PAIN

## 2023-11-24 NOTE — TELEPHONE ENCOUNTER
Last Appointment:  8/23/2023  Future Appointments   Date Time Provider [address]   12/1/2023 10:30 AM MD Leslie Ray [facility]   1/24/2024  1:15 PM Ovidio Litten, APRN - CNP [facility]

## 2023-11-25 NOTE — ED PROVIDER NOTES
HPI   68-year-old male patient here for evaluation of wound check. He had pilonidal abscess excision performed on 11/7//23 by Dr. Ambrose Rodriguez. Stating over the last day he is having increasing pain on the left buttock area and noting some yellowish drainage. No fevers or chills. He states he is going to see his surgeon on Monday as they were not able to get him in today. He also notes that he is having some bloating, he did take half of the GI cleanout that was prescribed to him as he was unable to finish the rest.  This happened yesterday. He did have significant bowel movements after but still having sensation of bloating. No pain no nausea or vomiting. Review of Systems   Constitutional:  Negative for chills and fever. HENT:  Negative for ear pain, sinus pressure and sore throat. Eyes:  Negative for pain, discharge and redness. Respiratory:  Negative for cough, shortness of breath and wheezing. Cardiovascular:  Negative for chest pain. Gastrointestinal:  Negative for abdominal pain, diarrhea, nausea and vomiting. Genitourinary:  Negative for dysuria and frequency. Musculoskeletal:  Negative for arthralgias and back pain. Skin:  Positive for wound. Negative for rash. Neurological:  Negative for weakness and headaches. Hematological:  Negative for adenopathy. All other systems reviewed and are negative. Physical Exam  Vitals and nursing note reviewed. Constitutional:       Appearance: He is well-developed. HENT:      Head: Normocephalic and atraumatic. Eyes:      Conjunctiva/sclera: Conjunctivae normal.   Cardiovascular:      Rate and Rhythm: Normal rate and regular rhythm. Heart sounds: Normal heart sounds. No murmur heard. Pulmonary:      Effort: Pulmonary effort is normal. No respiratory distress. Breath sounds: Normal breath sounds. No wheezing or rales. Abdominal:      General: Bowel sounds are normal.      Palpations: Abdomen is soft.       Tenderness:

## 2023-11-25 NOTE — DISCHARGE INSTRUCTIONS
If you develop worsening pain, worsening swelling please go to emergency department for evaluation. Monitor for fevers.

## 2023-11-26 RX ORDER — BUSPIRONE HYDROCHLORIDE 10 MG/1
TABLET ORAL
Qty: 180 TABLET | Refills: 0 | Status: SHIPPED | OUTPATIENT
Start: 2023-11-26

## 2023-11-28 ENCOUNTER — OFFICE VISIT (OUTPATIENT)
Dept: SURGERY | Age: 18
End: 2023-11-28

## 2023-11-28 VITALS
BODY MASS INDEX: 17.24 KG/M2 | TEMPERATURE: 97.1 F | WEIGHT: 101 LBS | DIASTOLIC BLOOD PRESSURE: 73 MMHG | SYSTOLIC BLOOD PRESSURE: 113 MMHG | HEIGHT: 64 IN | HEART RATE: 85 BPM

## 2023-11-28 DIAGNOSIS — Z98.890 S/P SURGICAL REMOVAL OF PILONIDAL CYST: Primary | ICD-10-CM

## 2023-11-28 PROCEDURE — 99024 POSTOP FOLLOW-UP VISIT: CPT | Performed by: SURGERY

## 2023-11-29 NOTE — PROGRESS NOTES
ROS: negative spontaneous bleeding or bruising  Endocrine ROS: negative  lethargy, mood swings, palpitations or polydipsia/polyuria  Respiratory ROS: negative sputum changes, stridor, tachypnea or wheezing  Cardiovascular ROS: negative for - loss of consciousness, murmur or orthopnea  Gastrointestinal ROS: negative for - hematochezia or hematemesis  Genito-Urinary ROS: negative for -  genital discharge or hematuria  Musculoskeletal ROS: negative for - focal weakness, gangrene  Psych/Neuro ROS: negative for - visual or auditory hallucinations, suicidal ideation      Time spent reviewing past medical, surgical, social and family history, vitals, nursing assessment and images.     Imaging: n/a    Pathology: -- Diagnosis --   Pilonidal cyst excision: Organizing pilonidal cyst abscess       Assessment/Plan:  Brianna Larose is a 19 y.o. male 3 weeks s/p pilonidal cystectomy, doing well    Minimal drainage and no signs of infection clinically  To Finish abx given by ED  No need for more abx at this time  F/u with Dr. Naa Flower as scheduled for possible suture removal    Physician Signature: Electronically signed by Dr. Gerline Aschoff  11/28/2023

## 2023-12-01 ENCOUNTER — OFFICE VISIT (OUTPATIENT)
Dept: SURGERY | Age: 18
End: 2023-12-01

## 2023-12-01 VITALS
TEMPERATURE: 97 F | BODY MASS INDEX: 17.34 KG/M2 | HEART RATE: 109 BPM | HEIGHT: 64 IN | SYSTOLIC BLOOD PRESSURE: 123 MMHG | OXYGEN SATURATION: 98 % | DIASTOLIC BLOOD PRESSURE: 85 MMHG

## 2023-12-01 DIAGNOSIS — L05.91 PILONIDAL CYST: Primary | ICD-10-CM

## 2023-12-01 PROCEDURE — 99024 POSTOP FOLLOW-UP VISIT: CPT | Performed by: SURGERY

## 2024-01-08 DIAGNOSIS — R94.31 ABNORMAL EKG: Primary | ICD-10-CM

## 2024-01-10 DIAGNOSIS — R10.13 EPIGASTRIC PAIN: ICD-10-CM

## 2024-01-10 DIAGNOSIS — K21.9 GASTRO-ESOPHAGEAL REFLUX DISEASE WITHOUT ESOPHAGITIS: ICD-10-CM

## 2024-01-15 ENCOUNTER — OFFICE VISIT (OUTPATIENT)
Dept: PRIMARY CARE CLINIC | Age: 19
End: 2024-01-15
Payer: COMMERCIAL

## 2024-01-15 VITALS
HEART RATE: 111 BPM | RESPIRATION RATE: 16 BRPM | OXYGEN SATURATION: 96 % | DIASTOLIC BLOOD PRESSURE: 78 MMHG | BODY MASS INDEX: 16.97 KG/M2 | SYSTOLIC BLOOD PRESSURE: 117 MMHG | WEIGHT: 99.4 LBS | TEMPERATURE: 97.3 F | HEIGHT: 64 IN

## 2024-01-15 DIAGNOSIS — R22.1 NODULE OF NECK: ICD-10-CM

## 2024-01-15 DIAGNOSIS — G44.019 EPISODIC CLUSTER HEADACHE, NOT INTRACTABLE: Primary | ICD-10-CM

## 2024-01-15 DIAGNOSIS — K59.00 CONSTIPATION, UNSPECIFIED CONSTIPATION TYPE: ICD-10-CM

## 2024-01-15 PROCEDURE — G8484 FLU IMMUNIZE NO ADMIN: HCPCS | Performed by: FAMILY MEDICINE

## 2024-01-15 PROCEDURE — 99214 OFFICE O/P EST MOD 30 MIN: CPT | Performed by: FAMILY MEDICINE

## 2024-01-15 PROCEDURE — G8419 CALC BMI OUT NRM PARAM NOF/U: HCPCS | Performed by: FAMILY MEDICINE

## 2024-01-15 PROCEDURE — 1036F TOBACCO NON-USER: CPT | Performed by: FAMILY MEDICINE

## 2024-01-15 PROCEDURE — G8427 DOCREV CUR MEDS BY ELIG CLIN: HCPCS | Performed by: FAMILY MEDICINE

## 2024-01-15 RX ORDER — PREDNISONE 20 MG/1
TABLET ORAL
Qty: 22 TABLET | Refills: 0 | Status: SHIPPED | OUTPATIENT
Start: 2024-01-15

## 2024-01-15 RX ORDER — ATOMOXETINE 40 MG/1
CAPSULE ORAL DAILY
COMMUNITY
Start: 2023-12-12

## 2024-01-15 ASSESSMENT — PATIENT HEALTH QUESTIONNAIRE - PHQ9
2. FEELING DOWN, DEPRESSED OR HOPELESS: 0
1. LITTLE INTEREST OR PLEASURE IN DOING THINGS: 0
SUM OF ALL RESPONSES TO PHQ QUESTIONS 1-9: 0
SUM OF ALL RESPONSES TO PHQ9 QUESTIONS 1 & 2: 0

## 2024-01-15 NOTE — PROGRESS NOTES
[facility]  Family Medicine Outpatient        SUBJECTIVE:  CC: had concerns including Headache (Pt states he has cluster headaches.  Pt states headaches are intermittent and are usually after 5 pm. Pt states he hasn't noticed any patterns ) and Mass (Pt states he has a lump on back of neck.  Pt states lump is on right side at base of skull.  Pt states lump is beneath skin.  Pt states he noticed lump several months ago).  HPI:  Antonino Sun is a male 18 y.o. presented to the clinic for a follow up appointment. Still having cluster headaches. Did not complete steroids because it was too strict. Went away for a few weeks but came back, not as frequently or as intense.     Review of Systems   Constitutional:  Negative for appetite change, fatigue and fever.   Respiratory:  Negative for cough, shortness of breath and wheezing.    Cardiovascular:  Negative for chest pain and palpitations.   Gastrointestinal:  Negative for abdominal pain, constipation, diarrhea, nausea and vomiting.   Neurological:  Positive for headaches. Negative for seizures, syncope and weakness.       Outpatient Medications Marked as Taking for the 1/15/24 encounter (Office Visit) with Samira Sena MD   Medication Sig Dispense Refill    atomoxetine (STRATTERA) 40 MG capsule Take by mouth daily      predniSONE (DELTASONE) 20 MG tablet Take 3 tablets qd x 3 days, then 2 tablets qd x 5 days, then 1 tablet qd x 3 days. 22 tablet 0    ondansetron (ZOFRAN) 4 MG tablet Take 1 tablet by mouth daily as needed for Nausea or Vomiting 30 tablet 0    busPIRone (BUSPAR) 10 MG tablet TAKE 1 TABLET BY MOUTH TWICE A DAY IN THE MORNING AND IN THE EVENING 180 tablet 0    acetaminophen (TYLENOL) 500 MG tablet Take 1 tablet by mouth 4 times daily for 3 days 12 tablet 0    senna (SENOKOT) 8.6 MG tablet Take 1 tablet by mouth 2 times daily as needed for Constipation 30 tablet 5    hydrOXYzine HCl (ATARAX) 10 MG tablet TAKE 1 (ONE) TABLET DAILY AS NEEDED FOR

## 2024-01-22 ASSESSMENT — ENCOUNTER SYMPTOMS
COUGH: 0
VOMITING: 0
WHEEZING: 0
DIARRHEA: 0
ABDOMINAL PAIN: 0
NAUSEA: 0
SHORTNESS OF BREATH: 0
CONSTIPATION: 0

## 2024-01-24 ENCOUNTER — OFFICE VISIT (OUTPATIENT)
Age: 19
End: 2024-01-24
Payer: COMMERCIAL

## 2024-01-24 VITALS
DIASTOLIC BLOOD PRESSURE: 66 MMHG | HEIGHT: 64 IN | OXYGEN SATURATION: 98 % | TEMPERATURE: 97 F | HEART RATE: 100 BPM | WEIGHT: 105 LBS | RESPIRATION RATE: 16 BRPM | BODY MASS INDEX: 17.93 KG/M2 | SYSTOLIC BLOOD PRESSURE: 118 MMHG

## 2024-01-24 DIAGNOSIS — K21.9 GASTRO-ESOPHAGEAL REFLUX DISEASE WITHOUT ESOPHAGITIS: Primary | ICD-10-CM

## 2024-01-24 DIAGNOSIS — K59.00 CONSTIPATION, UNSPECIFIED CONSTIPATION TYPE: ICD-10-CM

## 2024-01-24 PROCEDURE — 99212 OFFICE O/P EST SF 10 MIN: CPT | Performed by: NURSE PRACTITIONER

## 2024-01-24 PROCEDURE — G8419 CALC BMI OUT NRM PARAM NOF/U: HCPCS | Performed by: NURSE PRACTITIONER

## 2024-01-24 PROCEDURE — G8427 DOCREV CUR MEDS BY ELIG CLIN: HCPCS | Performed by: NURSE PRACTITIONER

## 2024-01-24 PROCEDURE — 1036F TOBACCO NON-USER: CPT | Performed by: NURSE PRACTITIONER

## 2024-01-24 PROCEDURE — G8484 FLU IMMUNIZE NO ADMIN: HCPCS | Performed by: NURSE PRACTITIONER

## 2024-01-24 RX ORDER — DOCUSATE SODIUM 100 MG/1
100 CAPSULE, LIQUID FILLED ORAL 2 TIMES DAILY PRN
Qty: 60 CAPSULE | Refills: 5 | Status: SHIPPED | OUTPATIENT
Start: 2024-01-24

## 2024-01-24 NOTE — PROGRESS NOTES
Antonino Sun (:  2005) is a 18 y.o. male, here for evaluation of the following chief complaint(s):  Follow-up (3 month follow up )      SUBJECTIVE/OBJECTIVE:  HPI:    Antonino is a very pleasant 18 year old male that presents today for follow up on constipation and bloating    Taking Miralax for constipation that does not satisfy  Requesting Colace; seems to work best  Concerned with hemorrhoids  Recently had surgery on a pilonidal cyst    Patient mentions having acid reflux;  he realized Strattera was part of the problem  Now he is eating before he takes it and will eat a cracker after taking it; this has helped  Taking Pantoprazole 40 mg daily  GES was WNL's even though the patients previous EGD noted food residual            ROS:  General: Patient denies n/v/f/c or weight loss.  HEENT: Patient denies persistent postnasal drip, scleral icterus, drooling, persistent bleeding from nose/mouth.  Resp: Patient denies SOB, wheezing, productive cough.  Cards: Patient denies CP, palpitations, significant edema  GI: As above.  Derm: Patient denies jaundice/rashes.   Musc: Patient denies diffuse/irregular joint swelling or myalgias.      Objective   Wt Readings from Last 3 Encounters:   24 47.6 kg (105 lb)   01/15/24 45.1 kg (99 lb 6.4 oz)   23 45.1 kg (99 lb 6.4 oz)     Temp Readings from Last 3 Encounters:   24 97 °F (36.1 °C) (Temporal)   01/15/24 97.3 °F (36.3 °C) (Temporal)   23 97.3 °F (36.3 °C) (Oral)     BP Readings from Last 3 Encounters:   24 118/66   01/15/24 117/78   23 119/80     Pulse Readings from Last 3 Encounters:   24 100   01/15/24 (!) 111   23 95        Physical Exam  Constitutional:       Appearance: Normal appearance.   Neurological:      Mental Status: He is alert.         Past Medical History:   Diagnosis Date    ADHD     Chronic depression     OCD (obsessive compulsive disorder)     Social anxiety disorder       Past Surgical History:

## 2024-01-30 RX ORDER — PANTOPRAZOLE SODIUM 40 MG/1
TABLET, DELAYED RELEASE ORAL
Qty: 180 TABLET | Refills: 1 | Status: SHIPPED | OUTPATIENT
Start: 2024-01-30

## 2024-01-30 NOTE — TELEPHONE ENCOUNTER
Pt needs refill  for pantoprazole. [facility]. Electronically signed by Melissa Penaloza LPN on 1/30/2024 at 9:22 AM      duplicate

## 2024-02-13 ENCOUNTER — TELEPHONE (OUTPATIENT)
Dept: FAMILY MEDICINE CLINIC | Age: 19
End: 2024-02-13

## 2024-02-13 DIAGNOSIS — G44.009 CLUSTER HEADACHE, NOT INTRACTABLE, UNSPECIFIED CHRONICITY PATTERN: Primary | ICD-10-CM

## 2024-02-13 NOTE — TELEPHONE ENCOUNTER
----- Message from Aurelia Herson sent at 2/13/2024  4:27 PM EST -----  Subject: Referral Request    Reason for referral request? Pt is still experiencing the cluster   headaches and would like to get a referral for a neuro - like discussed   with Samira Last patient wants to be referred to(if known):     Provider Phone Number(if known):    Additional Information for Provider?   ---------------------------------------------------------------------------  --------------  CALL BACK INFO    [phone]; OK to leave message on voicemail,OK to respond with electronic   message via [employer] portal (only for patients who have registered [employer]   account)  ---------------------------------------------------------------------------  --------------

## 2024-02-22 DIAGNOSIS — F41.9 ANXIETY: ICD-10-CM

## 2024-02-22 NOTE — TELEPHONE ENCOUNTER
Last Appointment:  1/15/2024  Future Appointments   Date Time Provider Department Center   5/21/2024  1:00 PM Alejandro Miner,  [facility]   7/24/2024 10:00 AM Twila Pittman APRN - CNP [facility]

## 2024-02-23 RX ORDER — BUSPIRONE HYDROCHLORIDE 10 MG/1
TABLET ORAL
Qty: 180 TABLET | Refills: 0 | Status: SHIPPED | OUTPATIENT
Start: 2024-02-23

## 2024-03-01 ENCOUNTER — TELEPHONE (OUTPATIENT)
Dept: PRIMARY CARE CLINIC | Age: 19
End: 2024-03-01

## 2024-03-01 NOTE — TELEPHONE ENCOUNTER
Patient left  stating he called Neurology to get an appointment and they told him he will need a CT/MRI/XR of his head before they will schedule him.       I tried to get xiang of [facility] but had to leave a  for someone to return my call.

## 2024-03-04 NOTE — TELEPHONE ENCOUNTER
AUGUST for [facility] to return my call to get clarification on what they want for the patient to be scheduled.         Neurology Department  [phone]

## 2024-03-14 NOTE — TELEPHONE ENCOUNTER
Last Appointment:  1/15/2024  Future Appointments   Date Time Provider Department Center   3/18/2024 11:00 AM Samira Sena MD [facility]   5/21/2024  1:00 PM Alejandro Miner,  [facility]   7/24/2024 10:00 AM Twila Pittman APRN - CNP [facility]      Patient has upcoming appointment to discuss testing for neurology

## 2024-03-18 ENCOUNTER — OFFICE VISIT (OUTPATIENT)
Dept: PRIMARY CARE CLINIC | Age: 19
End: 2024-03-18
Payer: COMMERCIAL

## 2024-03-18 VITALS
HEIGHT: 64 IN | DIASTOLIC BLOOD PRESSURE: 78 MMHG | HEART RATE: 100 BPM | OXYGEN SATURATION: 98 % | RESPIRATION RATE: 16 BRPM | BODY MASS INDEX: 17.34 KG/M2 | WEIGHT: 101.6 LBS | TEMPERATURE: 97.8 F | SYSTOLIC BLOOD PRESSURE: 129 MMHG

## 2024-03-18 DIAGNOSIS — G44.009 CLUSTER HEADACHE, NOT INTRACTABLE, UNSPECIFIED CHRONICITY PATTERN: Primary | ICD-10-CM

## 2024-03-18 DIAGNOSIS — K21.9 GASTROESOPHAGEAL REFLUX DISEASE, UNSPECIFIED WHETHER ESOPHAGITIS PRESENT: ICD-10-CM

## 2024-03-18 DIAGNOSIS — Z87.19 HISTORY OF CANKER SORES: ICD-10-CM

## 2024-03-18 DIAGNOSIS — K59.00 CONSTIPATION, UNSPECIFIED CONSTIPATION TYPE: ICD-10-CM

## 2024-03-18 PROCEDURE — G8419 CALC BMI OUT NRM PARAM NOF/U: HCPCS | Performed by: FAMILY MEDICINE

## 2024-03-18 PROCEDURE — G8427 DOCREV CUR MEDS BY ELIG CLIN: HCPCS | Performed by: FAMILY MEDICINE

## 2024-03-18 PROCEDURE — 1036F TOBACCO NON-USER: CPT | Performed by: FAMILY MEDICINE

## 2024-03-18 PROCEDURE — 99214 OFFICE O/P EST MOD 30 MIN: CPT | Performed by: FAMILY MEDICINE

## 2024-03-18 PROCEDURE — G8484 FLU IMMUNIZE NO ADMIN: HCPCS | Performed by: FAMILY MEDICINE

## 2024-03-18 ASSESSMENT — ENCOUNTER SYMPTOMS
WHEEZING: 0
DIARRHEA: 0
COUGH: 0
SHORTNESS OF BREATH: 0
VOMITING: 0
CONSTIPATION: 1
ABDOMINAL PAIN: 0
NAUSEA: 0

## 2024-03-18 NOTE — PROGRESS NOTES
(ONE) TABLET DAILY AS NEEDED FOR ANXIETY      sucralfate (CARAFATE) 1 GM tablet Take 1 tablet by mouth daily as needed (gerd) 30 tablet 0    polyethylene glycol (GLYCOLAX) 17 g packet Take 1 packet by mouth daily as needed for Constipation      fluvoxaMINE (LUVOX) 100 MG tablet Take 1 tablet by mouth daily         I have reviewed all pertinent PMHx, PSHx, FamHx, SocialHx, medications, and allergies and updated history as appropriate.    OBJECTIVE    VS: /78   Pulse 100   Temp 97.8 °F (36.6 °C) (Oral)   Resp 16   Ht 1.626 m (5' 4\")   Wt 46.1 kg (101 lb 9.6 oz)   SpO2 98%   BMI 17.44 kg/m²   Physical Exam  Constitutional:       General: He is not in acute distress.     Appearance: He is well-developed. He is not diaphoretic.   HENT:      Head: Normocephalic and atraumatic.   Eyes:      Conjunctiva/sclera: Conjunctivae normal.      Pupils: Pupils are equal, round, and reactive to light.   Cardiovascular:      Rate and Rhythm: Normal rate and regular rhythm.   Pulmonary:      Effort: Pulmonary effort is normal.      Breath sounds: Normal breath sounds.   Abdominal:      General: Bowel sounds are normal. There is no distension.      Palpations: Abdomen is soft.      Tenderness: There is no abdominal tenderness.      Hernia: No hernia is present.   Musculoskeletal:      Cervical back: Normal range of motion and neck supple.   Skin:     General: Skin is warm and dry.   Neurological:      Mental Status: He is alert and oriented to person, place, and time.       ASSESSMENT/PLAN:  1. Cluster headache, not intractable, unspecified chronicity pattern  Historic with increased frequency since last visit. Last headache was last Thursday and goes around his head. No trauma/falls. Feels like he got \"hooked\" on caffeine a bit and noticed a tolerance from using Excedrin. Query some component of withdrawal. Cut back on use. Requested Neurology referral. States Neurology told him he can have any image of his head prior to

## 2024-03-26 ENCOUNTER — TELEPHONE (OUTPATIENT)
Age: 19
End: 2024-03-26

## 2024-03-26 NOTE — TELEPHONE ENCOUNTER
Patient called and said he feels impacted. This has been going on for a couple of weeks now. He used a dulcolax suppository and colace and he felt like he emptied some of his bowels, but not completely. He said he does take the colace daily. He is asking what he needs to do or to use something different. Electronically signed by Melissa Penaloza LPN on 3/26/2024 at 8:08 AM

## 2024-03-27 ENCOUNTER — APPOINTMENT (OUTPATIENT)
Dept: CT IMAGING | Age: 19
End: 2024-03-27
Payer: COMMERCIAL

## 2024-03-27 ENCOUNTER — HOSPITAL ENCOUNTER (EMERGENCY)
Age: 19
Discharge: HOME | End: 2024-03-28
Payer: COMMERCIAL

## 2024-03-27 VITALS
WEIGHT: 98 LBS | BODY MASS INDEX: 16.73 KG/M2 | DIASTOLIC BLOOD PRESSURE: 80 MMHG | HEIGHT: 64 IN | RESPIRATION RATE: 20 BRPM | SYSTOLIC BLOOD PRESSURE: 131 MMHG | TEMPERATURE: 97.9 F | OXYGEN SATURATION: 98 % | HEART RATE: 84 BPM

## 2024-03-27 DIAGNOSIS — K59.00 CONSTIPATION, UNSPECIFIED CONSTIPATION TYPE: Primary | ICD-10-CM

## 2024-03-27 LAB
ALBUMIN SERPL-MCNC: 4.8 G/DL (ref 3.5–5.2)
ALP SERPL-CCNC: 74 U/L (ref 40–129)
ALT SERPL-CCNC: 17 U/L (ref 0–40)
ANION GAP SERPL CALCULATED.3IONS-SCNC: 10 MMOL/L (ref 7–16)
AST SERPL-CCNC: 21 U/L (ref 0–39)
BASOPHILS # BLD: 0.07 K/UL (ref 0–0.2)
BASOPHILS NFR BLD: 1 % (ref 0–2)
BILIRUB SERPL-MCNC: 0.3 MG/DL (ref 0–1.2)
BILIRUB UR QL STRIP: NEGATIVE
BUN SERPL-MCNC: 13 MG/DL (ref 6–20)
CALCIUM SERPL-MCNC: 9.9 MG/DL (ref 8.6–10.2)
CHLORIDE SERPL-SCNC: 101 MMOL/L (ref 98–107)
CLARITY UR: CLEAR
CO2 SERPL-SCNC: 29 MMOL/L (ref 22–29)
COLOR UR: YELLOW
CREAT SERPL-MCNC: 0.8 MG/DL (ref 0.7–1.2)
EOSINOPHIL # BLD: 0.2 K/UL (ref 0.05–0.5)
EOSINOPHILS RELATIVE PERCENT: 3 % (ref 0–6)
ERYTHROCYTE [DISTWIDTH] IN BLOOD BY AUTOMATED COUNT: 12.4 % (ref 11.5–15)
GFR SERPL CREATININE-BSD FRML MDRD: >90 ML/MIN/1.73M2
GLUCOSE SERPL-MCNC: 98 MG/DL (ref 74–99)
GLUCOSE UR STRIP-MCNC: NEGATIVE MG/DL
HCT VFR BLD AUTO: 41.3 % (ref 37–54)
HGB BLD-MCNC: 13.9 G/DL (ref 12.5–16.5)
HGB UR QL STRIP.AUTO: NEGATIVE
IMM GRANULOCYTES # BLD AUTO: <0.03 K/UL (ref 0–0.58)
IMM GRANULOCYTES NFR BLD: 0 % (ref 0–5)
KETONES UR STRIP-MCNC: NEGATIVE MG/DL
LEUKOCYTE ESTERASE UR QL STRIP: NEGATIVE
LYMPHOCYTES NFR BLD: 2.22 K/UL (ref 1.5–4)
LYMPHOCYTES RELATIVE PERCENT: 28 % (ref 20–42)
MCH RBC QN AUTO: 28.5 PG (ref 26–35)
MCHC RBC AUTO-ENTMCNC: 33.7 G/DL (ref 32–34.5)
MCV RBC AUTO: 84.6 FL (ref 80–99.9)
MONOCYTES NFR BLD: 0.63 K/UL (ref 0.1–0.95)
MONOCYTES NFR BLD: 8 % (ref 2–12)
NEUTROPHILS NFR BLD: 60 % (ref 43–80)
NEUTS SEG NFR BLD: 4.7 K/UL (ref 1.8–7.3)
NITRITE UR QL STRIP: NEGATIVE
PH UR STRIP: 7 [PH] (ref 5–9)
PLATELET, FLUORESCENCE: 245 K/UL (ref 130–450)
PMV BLD AUTO: 10.4 FL (ref 7–12)
POTASSIUM SERPL-SCNC: 4.1 MMOL/L (ref 3.5–5)
PROT SERPL-MCNC: 7.5 G/DL (ref 6.4–8.3)
PROT UR STRIP-MCNC: NEGATIVE MG/DL
RBC # BLD AUTO: 4.88 M/UL (ref 3.8–5.8)
RBC #/AREA URNS HPF: ABNORMAL /HPF
SODIUM SERPL-SCNC: 140 MMOL/L (ref 132–146)
SP GR UR STRIP: 1.01 (ref 1–1.03)
UROBILINOGEN UR STRIP-ACNC: 2 EU/DL (ref 0–1)
WBC #/AREA URNS HPF: ABNORMAL /HPF
WBC OTHER # BLD: 7.8 K/UL (ref 4.5–11.5)

## 2024-03-27 PROCEDURE — 74177 CT ABD & PELVIS W/CONTRAST: CPT

## 2024-03-27 PROCEDURE — 6360000004 HC RX CONTRAST MEDICATION: Performed by: RADIOLOGY

## 2024-03-27 PROCEDURE — 81001 URINALYSIS AUTO W/SCOPE: CPT

## 2024-03-27 PROCEDURE — 99285 EMERGENCY DEPT VISIT HI MDM: CPT

## 2024-03-27 PROCEDURE — 85025 COMPLETE CBC W/AUTO DIFF WBC: CPT

## 2024-03-27 PROCEDURE — 80053 COMPREHEN METABOLIC PANEL: CPT

## 2024-03-27 RX ADMIN — IOPAMIDOL 70 ML: 755 INJECTION, SOLUTION INTRAVENOUS at 22:49

## 2024-03-27 ASSESSMENT — LIFESTYLE VARIABLES
HOW MANY STANDARD DRINKS CONTAINING ALCOHOL DO YOU HAVE ON A TYPICAL DAY: PATIENT DOES NOT DRINK
HOW OFTEN DO YOU HAVE A DRINK CONTAINING ALCOHOL: NEVER

## 2024-03-28 RX ORDER — SENNOSIDES 8.6 MG
2 TABLET ORAL 2 TIMES DAILY
Qty: 20 TABLET | Refills: 0 | Status: SHIPPED | OUTPATIENT
Start: 2024-03-28 | End: 2024-04-02

## 2024-03-28 RX ORDER — POLYETHYLENE GLYCOL 3350 17 G/17G
POWDER, FOR SOLUTION ORAL
Qty: 238 G | Refills: 0 | Status: SHIPPED | OUTPATIENT
Start: 2024-03-28 | End: 2024-04-11

## 2024-03-28 NOTE — ED PROVIDER NOTES
movement for at least 1 week states that he had a very small bowel movement today but reports he still feels constipated.  Reports he took a suppository and had an enema around 830 which led to a very small bowel movement.  He complains of nausea and abdominal discomfort.  Denies any fevers chills, vomiting or blood in his stools.  Patient also reports that he was instructed to mix a medium sized bottle of MiraLAX in 64 ounces of Gatorade and drink about half of that throughout the day yesterday but states \"it was too much I could not drink all that fluid\" he was advised to do this by gastroenterology.   Will obtain labs to include CBC, CMP, urinalysis, CT abdomen pelvis to evaluate constipation versus ileus versus obstruction.  ED Course as of 03/28/24 0047   Thu Mar 28, 2024   0012 CBC, CMP unremarkable, urinalysis unremarkable [MR]   0015 CT abdomen pelvis does support constipation with large amount of stool.  Patient states that he drank about half of the ordered amount of MiraLAX yesterday as advised by his gastroenterologist.  He states that it felt like it was too much fluid to drink.  Will place patient on senna and MiraLAX.  He is also advised to continue with his stool softener and to increase his water intake.  I did encourage him to call his gastroenterologist tomorrow to schedule appointment to be seen as soon as possible.  He is invited to return for further concerns or worsen [MR]      ED Course User Index  [MR] Rachelle Lott, APRN - CNP           Counseling:   The emergency provider has spoken with the patient and discussed today’s results, in addition to providing specific details for the plan of care and counseling regarding the diagnosis and prognosis.  Questions are answered at this time and they are agreeable with the plan.      --------------------------------- IMPRESSION AND DISPOSITION ---------------------------------    IMPRESSION  1. Constipation, unspecified constipation type

## 2024-04-08 ENCOUNTER — HOSPITAL ENCOUNTER (OUTPATIENT)
Dept: CT IMAGING | Age: 19
Discharge: HOME | End: 2024-04-10
Payer: COMMERCIAL

## 2024-04-08 ENCOUNTER — OFFICE VISIT (OUTPATIENT)
Age: 19
End: 2024-04-08
Payer: COMMERCIAL

## 2024-04-08 VITALS
SYSTOLIC BLOOD PRESSURE: 124 MMHG | DIASTOLIC BLOOD PRESSURE: 70 MMHG | OXYGEN SATURATION: 99 % | TEMPERATURE: 97 F | HEART RATE: 118 BPM | BODY MASS INDEX: 18.54 KG/M2 | WEIGHT: 108 LBS

## 2024-04-08 DIAGNOSIS — K21.9 GASTROESOPHAGEAL REFLUX DISEASE, UNSPECIFIED WHETHER ESOPHAGITIS PRESENT: ICD-10-CM

## 2024-04-08 DIAGNOSIS — G44.009 CLUSTER HEADACHE, NOT INTRACTABLE, UNSPECIFIED CHRONICITY PATTERN: ICD-10-CM

## 2024-04-08 DIAGNOSIS — K59.00 CONSTIPATION, UNSPECIFIED CONSTIPATION TYPE: Primary | ICD-10-CM

## 2024-04-08 PROCEDURE — 99212 OFFICE O/P EST SF 10 MIN: CPT | Performed by: NURSE PRACTITIONER

## 2024-04-08 PROCEDURE — 1036F TOBACCO NON-USER: CPT | Performed by: NURSE PRACTITIONER

## 2024-04-08 PROCEDURE — G8427 DOCREV CUR MEDS BY ELIG CLIN: HCPCS | Performed by: NURSE PRACTITIONER

## 2024-04-08 PROCEDURE — G8420 CALC BMI NORM PARAMETERS: HCPCS | Performed by: NURSE PRACTITIONER

## 2024-04-08 PROCEDURE — 70450 CT HEAD/BRAIN W/O DYE: CPT

## 2024-04-08 RX ORDER — ONDANSETRON 4 MG/1
4 TABLET, FILM COATED ORAL DAILY PRN
Qty: 30 TABLET | Refills: 0 | Status: SHIPPED | OUTPATIENT
Start: 2024-04-08

## 2024-04-08 RX ORDER — SUCRALFATE 1 G/1
1 TABLET ORAL 4 TIMES DAILY
Qty: 120 TABLET | Refills: 1 | Status: SHIPPED | OUTPATIENT
Start: 2024-04-08

## 2024-04-08 NOTE — PROGRESS NOTES
Antonino Sun (:  2005) is a 18 y.o. male, here for evaluation of the following chief complaint(s):  Constipation and Abdominal Pain (ED follow up for 3/27/24/)      SUBJECTIVE/OBJECTIVE:  HPI:    Antonino is a very pleasant 18 year old gentleman that presents today for ER follow up on constipation    Patient presented to the ER for inability to have a BM  There was abdominal pain associated    CT scan in the ER-  1. No acute intra-abdominal or pelvic process.  2. Constipation    The ER advised patient to take Miralax and senokot daily  He is having a BM every other day, stools are soft and formed    Patient complains of mid abdominal pain that presents about 30 minutes after eating  Described as \"sharp and sour\"  Usually will last about 30 minutes  Taking Pantoprazole 40 mg twice a day that satisfies acid reflux  Not taking Carafate   Not a smoker, no alcohol    Admits to taking Excedrin frequently for cluster headaches    EGD -Normal esophagus.  Normal stomach. H pylori biopsies obtained.  Food debris in stomach consistent with possible gastroparesis.   Normal duodenum. Biopsied.             ROS:  General: Patient denies n/v/f/c or weight loss.  HEENT: Patient denies persistent postnasal drip, scleral icterus, drooling, persistent bleeding from nose/mouth.  Resp: Patient denies SOB, wheezing, productive cough.  Cards: Patient denies CP, palpitations, significant edema  GI: As above.  Derm: Patient denies jaundice/rashes.   Musc: Patient denies diffuse/irregular joint swelling or myalgias.      Objective   Wt Readings from Last 3 Encounters:   24 49 kg (108 lb)   24 44.5 kg (98 lb)   24 46.1 kg (101 lb 9.6 oz)     Temp Readings from Last 3 Encounters:   24 97 °F (36.1 °C)   24 97.9 °F (36.6 °C) (Oral)   24 97.8 °F (36.6 °C) (Oral)     BP Readings from Last 3 Encounters:   24 124/70   24 131/80   24 129/78     Pulse Readings from Last 3 Encounters:

## 2024-04-30 ENCOUNTER — CLINICAL DOCUMENTATION (OUTPATIENT)
Dept: NUTRITION | Age: 19
End: 2024-04-30

## 2024-04-30 NOTE — PROGRESS NOTES
Nutrition Follow Up Note    Date: 4/30/2024  Patient: Antonino Sun  Referring Clinician: Samira Sena MD  Fax: [phone]  Patient's Last Session: 10/3/2023  Reason for Visit: Underweight, IBS symptoms    Progress with SMART goals:   Will drink at least 2 carnation instant breakfast drinks per day or alternative protein shake - 0%    Current eating pattern:   Eating breakfast 90 min after waking - every day same: banana, applesauce, rice crispies no milk -or-   Banana, 2 pieces bread, aplesauce yogurt bethany crackers     Snack: honey wheat pretzels, mandarin oranges, fruits: grapes, strawberries    Lunch 2:30-5pm - Skips or protein granola bar, 2 packs fruit snacks or [place] bread pizza, popcorn chicken, Leftover au gratin potatoes, peanut butter crackers     Dinner 7:30-11pm - 18 pieces popcorn chicken from [employer] or [employer] chicken rice and vegetables     6-7 water bottles per day, Pediasure     Uma calendar frozen dinners    GI Symptom Trigger foods: peas, cauliflower, garlic, onion   Dairy products is a possible trigger. 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     Pt reports chronic severe abdominal pain and constipation. States that he was not diagnosed with gastroparesis, and that the physician suspects either IBS or Crohn's Disease. Pt states that he is not

## 2024-05-01 ENCOUNTER — OFFICE VISIT (OUTPATIENT)
Age: 19
End: 2024-05-01
Payer: COMMERCIAL

## 2024-05-01 VITALS
OXYGEN SATURATION: 96 % | HEART RATE: 101 BPM | SYSTOLIC BLOOD PRESSURE: 118 MMHG | BODY MASS INDEX: 18.02 KG/M2 | DIASTOLIC BLOOD PRESSURE: 58 MMHG | TEMPERATURE: 97 F | WEIGHT: 105 LBS

## 2024-05-01 DIAGNOSIS — K59.00 CONSTIPATION, UNSPECIFIED CONSTIPATION TYPE: Primary | ICD-10-CM

## 2024-05-01 DIAGNOSIS — K21.9 GASTROESOPHAGEAL REFLUX DISEASE, UNSPECIFIED WHETHER ESOPHAGITIS PRESENT: ICD-10-CM

## 2024-05-01 DIAGNOSIS — R10.9 ABDOMINAL PAIN, UNSPECIFIED ABDOMINAL LOCATION: ICD-10-CM

## 2024-05-01 PROCEDURE — G8427 DOCREV CUR MEDS BY ELIG CLIN: HCPCS | Performed by: NURSE PRACTITIONER

## 2024-05-01 PROCEDURE — 99212 OFFICE O/P EST SF 10 MIN: CPT | Performed by: NURSE PRACTITIONER

## 2024-05-01 PROCEDURE — G8419 CALC BMI OUT NRM PARAM NOF/U: HCPCS | Performed by: NURSE PRACTITIONER

## 2024-05-01 PROCEDURE — 1036F TOBACCO NON-USER: CPT | Performed by: NURSE PRACTITIONER

## 2024-05-01 RX ORDER — SUCRALFATE ORAL 1 G/10ML
1 SUSPENSION ORAL 4 TIMES DAILY
Qty: 1200 ML | Refills: 3 | Status: SHIPPED | OUTPATIENT
Start: 2024-05-01

## 2024-05-01 RX ORDER — DOCUSATE SODIUM 100 MG/1
100 CAPSULE, LIQUID FILLED ORAL 2 TIMES DAILY PRN
Qty: 60 CAPSULE | Refills: 5 | Status: SHIPPED | OUTPATIENT
Start: 2024-05-01

## 2024-05-01 NOTE — PROGRESS NOTES
Antonino Sun (:  2005) is a 18 y.o. male, here for evaluation of the following chief complaint(s):  Constipation      SUBJECTIVE/OBJECTIVE:  HPI:    Antonino is a very pleasant 18 year old gentleman that presents today for follow up on constipation and abdominal pain    Taking Pantoprazole 40 mg daily that satisfies abdominal pain  Patient does not like sucralfate tablets because they are too big  Requests liquid sucralfate instead    Stool softeners satisfy the constipation    Patient is being tested for Adeola Danlos syndrome    CT scan in the ER-  1. No acute intra-abdominal or pelvic process.  2. Constipation     EGD -  Normal esophagus.  Normal stomach. H pylori biopsies obtained.  Food debris in stomach consistent with possible gastroparesis.   Normal duodenum. Biopsied.        ROS:  General: Patient denies n/v/f/c or weight loss.  HEENT: Patient denies persistent postnasal drip, scleral icterus, drooling, persistent bleeding from nose/mouth.  Resp: Patient denies SOB, wheezing, productive cough.  Cards: Patient denies CP, palpitations, significant edema  GI: As above.  Derm: Patient denies jaundice/rashes.   Musc: Patient denies diffuse/irregular joint swelling or myalgias.      Objective   Wt Readings from Last 3 Encounters:   24 47.6 kg (105 lb)   24 49 kg (108 lb)   24 44.5 kg (98 lb)     Temp Readings from Last 3 Encounters:   24 97 °F (36.1 °C)   24 97 °F (36.1 °C)   24 97.9 °F (36.6 °C) (Oral)     BP Readings from Last 3 Encounters:   24 (!) 118/58   24 124/70   24 131/80     Pulse Readings from Last 3 Encounters:   24 (!) 101   24 (!) 118   24 84        Physical Exam  Constitutional:       Appearance: Normal appearance.   Neurological:      Mental Status: He is alert.         Past Medical History:   Diagnosis Date    ADHD     Chronic depression     OCD (obsessive compulsive disorder)     Social anxiety disorder

## 2024-05-06 DIAGNOSIS — R10.9 ABDOMINAL PAIN, UNSPECIFIED ABDOMINAL LOCATION: Primary | ICD-10-CM

## 2024-05-08 RX ORDER — SUCRALFATE ORAL 1 G/10ML
1 SUSPENSION ORAL 4 TIMES DAILY
Qty: 1200 ML | Refills: 3 | Status: SHIPPED | OUTPATIENT
Start: 2024-05-08

## 2024-05-10 ENCOUNTER — OFFICE VISIT (OUTPATIENT)
Dept: CARDIOLOGY CLINIC | Age: 19
End: 2024-05-10
Payer: COMMERCIAL

## 2024-05-10 VITALS
DIASTOLIC BLOOD PRESSURE: 63 MMHG | HEART RATE: 112 BPM | WEIGHT: 102 LBS | SYSTOLIC BLOOD PRESSURE: 100 MMHG | HEIGHT: 61 IN | RESPIRATION RATE: 16 BRPM | BODY MASS INDEX: 19.26 KG/M2

## 2024-05-10 DIAGNOSIS — F90.9 ATTENTION DEFICIT HYPERACTIVITY DISORDER (ADHD), UNSPECIFIED ADHD TYPE: Primary | ICD-10-CM

## 2024-05-10 DIAGNOSIS — R94.31 ABNORMAL ELECTROCARDIOGRAPHY: ICD-10-CM

## 2024-05-10 PROCEDURE — 99243 OFF/OP CNSLTJ NEW/EST LOW 30: CPT | Performed by: INTERNAL MEDICINE

## 2024-05-10 PROCEDURE — G8420 CALC BMI NORM PARAMETERS: HCPCS | Performed by: INTERNAL MEDICINE

## 2024-05-10 PROCEDURE — G8427 DOCREV CUR MEDS BY ELIG CLIN: HCPCS | Performed by: INTERNAL MEDICINE

## 2024-05-10 PROCEDURE — 93000 ELECTROCARDIOGRAM COMPLETE: CPT | Performed by: INTERNAL MEDICINE

## 2024-05-10 NOTE — PROGRESS NOTES
[facility] consult  Dr. Johanny Lugo      Reason for Consult: Abnormal EKG  Referring Physician: Samira Sena MD     CHIEF COMPLAINT:   Chief Complaint   Patient presents with    New Patient       HISTORY OF PRESENT ILLNESS:   Patient is 18 years old male with history of ADHD, anxiety disorder, was referred to cardiology for evaluation of abnormal EKG.  Occasional lightheadedness and dizziness, patient denies any chest pain, no shortness of breath, no lightheadedness, no dizziness, no palpitations, no pedal edema, no PND, no orthopnea, no syncope, no presyncopal episodes.  Functional capacity is good for age    Past Medical History:   Diagnosis Date    ADHD     Chronic depression     OCD (obsessive compulsive disorder)     Social anxiety disorder          Past Surgical History:   Procedure Laterality Date    HERNIA REPAIR  2009    Inguinal Hernia    PILONIDAL CYST EXCISION N/A 11/7/2023    EXCISION PILONIDAL CYST [68931] performed by Kofi Sanchez MD at [facility] OR    UPPER GASTROINTESTINAL ENDOSCOPY N/A 09/19/2023    EGD BIOPSY performed by David Colon MD at [facility]         Current Outpatient Medications   Medication Sig Dispense Refill    sucralfate (CARAFATE) 1 GM/10ML suspension Take 10 mLs by mouth 4 times daily 1200 mL 3    docusate sodium (COLACE) 100 MG capsule Take 1 capsule by mouth 2 times daily as needed for Constipation 60 capsule 5    ondansetron (ZOFRAN) 4 MG tablet Take 1 tablet by mouth daily as needed for Nausea or Vomiting 30 tablet 0    busPIRone (BUSPAR) 10 MG tablet TAKE 1 TABLET BY MOUTH TWICE A DAY IN THE MORNING AND IN THE EVENING 180 tablet 0    pantoprazole (PROTONIX) 40 MG tablet TAKE 1 TABLET BY MOUTH TWICE A  tablet 1    atomoxetine (STRATTERA) 40 MG capsule Take by mouth daily      hydrOXYzine HCl (ATARAX) 10 MG tablet TAKE 1 (ONE) TABLET DAILY AS NEEDED FOR ANXIETY      fluvoxaMINE (LUVOX) 100 MG tablet Take 1 tablet by mouth daily       No current

## 2024-05-21 ENCOUNTER — OFFICE VISIT (OUTPATIENT)
Dept: RHEUMATOLOGY | Age: 19
End: 2024-05-21
Payer: COMMERCIAL

## 2024-05-21 VITALS — HEIGHT: 65 IN | BODY MASS INDEX: 16.66 KG/M2 | WEIGHT: 100 LBS

## 2024-05-21 DIAGNOSIS — M13.0 POLYARTHRITIS: ICD-10-CM

## 2024-05-21 DIAGNOSIS — Q87.43: Primary | ICD-10-CM

## 2024-05-21 PROCEDURE — G8419 CALC BMI OUT NRM PARAM NOF/U: HCPCS | Performed by: INTERNAL MEDICINE

## 2024-05-21 PROCEDURE — 99204 OFFICE O/P NEW MOD 45 MIN: CPT | Performed by: INTERNAL MEDICINE

## 2024-05-21 PROCEDURE — 1036F TOBACCO NON-USER: CPT | Performed by: INTERNAL MEDICINE

## 2024-05-21 PROCEDURE — G8427 DOCREV CUR MEDS BY ELIG CLIN: HCPCS | Performed by: INTERNAL MEDICINE

## 2024-05-21 ASSESSMENT — ENCOUNTER SYMPTOMS
ABDOMINAL PAIN: 1
VOMITING: 0
COUGH: 0
SHORTNESS OF BREATH: 0
ABDOMINAL DISTENTION: 1
CONSTIPATION: 1
DIARRHEA: 1
TROUBLE SWALLOWING: 0
NAUSEA: 1
COLOR CHANGE: 0

## 2024-05-21 NOTE — PROGRESS NOTES
Antonino Sun 2005 is a 18 y.o. male, here for evaluation of the following chief complaint(s):  New Patient (Antonino is here today as a new patient for Arthralgia)      Assessment & Plan   ASSESSMENT/PLAN:    Antonino Sun 2005 is a 18 y.o. male seen in consult for polyarthritis.    1.  Polyarthritis-I see no synovitis on exam to suggest an underlying inflammatory arthritis but will need further workup as below.  He does certainly meet criteria for hypermobility syndrome which is likely the source of his joint pain.    2.  Hypermobility syndrome-he meets 7 out of 9 [place] criteria.  Treatment for joint pain is symptomatic.  However he does have long arms and legs for his height suggestive of marfanoid habitus though he is not tall by any means.  His skin has some elasticity.  He has no scars for evaluation.  He has no family history of sudden cardiac death.  However given extensive hypermobility and long arms and legs I think he does warrant a genetics evaluation.  I have given him a referral.    If workup below is unrevealing he can follow-up with me on a as needed basis.  If I see anything concerning on workup below I will have him back for follow-up as appropriate.    1. Marfanoid hypermobility syndrome  -     External Referral To Genetics  -     KRISS; Future  -     C-Reactive Protein; Future  -     Sedimentation Rate; Future  -     Rheumatoid Factor; Future  -     Cyclic Citrul Peptide Antibody, IgG; Future  2. Polyarthritis  -     External Referral To Genetics  -     KRISS; Future  -     C-Reactive Protein; Future  -     Sedimentation Rate; Future  -     Rheumatoid Factor; Future  -     Cyclic Citrul Peptide Antibody, IgG; Future      Return if symptoms worsen or fail to improve.         Subjective   SUBJECTIVE/OBJECTIVE:    HPI: Antonino Sun 2005 is a 18 y.o. male seen in consult for polyarthritis.  Patient states he has had diffuse joint pain and body pain for some time.  He states most

## 2024-05-21 NOTE — PATIENT INSTRUCTIONS
You have hypermobility either way    Will need genetics evaluation for possible EDS/Marfan's syndrome    Have blood work

## 2024-05-28 ENCOUNTER — CLINICAL DOCUMENTATION (OUTPATIENT)
Dept: NUTRITION | Age: 19
End: 2024-05-28

## 2024-05-28 DIAGNOSIS — R10.13 EPIGASTRIC PAIN: ICD-10-CM

## 2024-05-28 DIAGNOSIS — K21.9 GASTRO-ESOPHAGEAL REFLUX DISEASE WITHOUT ESOPHAGITIS: ICD-10-CM

## 2024-05-28 RX ORDER — PANTOPRAZOLE SODIUM 40 MG/1
40 TABLET, DELAYED RELEASE ORAL 2 TIMES DAILY
Qty: 180 TABLET | Refills: 1 | Status: SHIPPED | OUTPATIENT
Start: 2024-05-28

## 2024-05-28 NOTE — PROGRESS NOTES
Nutrition Follow Up Note    Date: 5/28/2024  Patient: Antonino Sun  Referring Clinician: Samira Sena MD  Fax: [phone]  Patient's Last Session: 4/30/24  Reason for Visit: Underweight, IBS symptoms    Progress with SMART goals:   Consume 3 balanced meals per day per MyPlate method at least 4 times per week - 50%  [place] with Healcerion or Via6 plant based protein shakes for preference - 100%  Consume balanced breakfast within 2hours of waking and substitue a protein shake for lunch or dinner as needed. - 75%    Current eating pattern:   Rise 7am - 8:30am - Magic spoon, almond milk If eat too much cereal less than a cup     Lunch - popcorn chicken (frozen from [employer])    Went to his parent's house once in the last month and had pulled pork, creamed corn, and steamed broccoli seasoned (states that he does not like vegetables plain).  Does not like sweet potatoes    Consuming 2 carnation instant breakfast per day    Fluids: At least 64 ounces water per day, 2 bottles Gatorade per day.     Pt states that he is not liking fruit lately, eating mandarin oranges seldomly    Current exercise routine:   Does not have a car, walks     CBW: 105 lb as of 5/1/24 per EMR   Height: 5'5\"    Handouts given: Gluten free nutrition therapy    Notes:   Pt states that tests for GI status Crohn's and IBS have been inconclusive. Pt pending genetic testing and meeting with Reumatology to rule out Adeola-Danlos syndrome. Pt endorses history of fainting/dizziness and low blood pressure. Pt endorses chronic abdominal pain and constipation. States that the pain has improved somewhat since the last RD assessment.     Pt consumes primarily pre-packaged microwave-able meals. Emphasized importance of opting for whole foods and increased dietary fiber for optimal GI function and anti-inflammatory protocol. Pt states that he does not feel comfortable being in the living area/kitchen in his apartment due to problems with roommates. Pt

## 2024-06-20 ENCOUNTER — TELEPHONE (OUTPATIENT)
Dept: CARDIOLOGY | Age: 19
End: 2024-06-20

## 2024-06-28 ENCOUNTER — TELEPHONE (OUTPATIENT)
Dept: CARDIOLOGY | Age: 19
End: 2024-06-28

## 2024-06-28 NOTE — TELEPHONE ENCOUNTER
is currently on MAGGIE     Are you able to do a peer to peer for this patient echo     Please advise

## 2024-06-28 NOTE — TELEPHONE ENCOUNTER
[facility] requesting Peer to Peer at [phone] regarding tracking #[id number] regarding request for prior auth for echocardiogram. All pertinent medical was sent to [facility] during initial request for auth to [facility]. Please advise.

## 2024-07-02 ENCOUNTER — TELEPHONE (OUTPATIENT)
Age: 19
End: 2024-07-02

## 2024-07-02 NOTE — TELEPHONE ENCOUNTER
Pt called and said he is experiencing \"really bad reflux\" since the weekend.  Pepto bismol doesn't help. He has some nausea and vomiting.  Explained that we do not have a provider today and he can go to his PCP or urgent care. He is able to call us tomorrow with an update when Twila will be here. Electronically signed by STEVE COREY LPN on 7/2/2024 at 8:16 AM

## 2024-07-08 ENCOUNTER — OFFICE VISIT (OUTPATIENT)
Age: 19
End: 2024-07-08
Payer: COMMERCIAL

## 2024-07-08 VITALS
BODY MASS INDEX: 17.06 KG/M2 | HEART RATE: 95 BPM | WEIGHT: 102.4 LBS | RESPIRATION RATE: 16 BRPM | TEMPERATURE: 97.4 F | OXYGEN SATURATION: 94 % | HEIGHT: 65 IN | SYSTOLIC BLOOD PRESSURE: 104 MMHG | DIASTOLIC BLOOD PRESSURE: 66 MMHG

## 2024-07-08 DIAGNOSIS — K21.9 GASTRO-ESOPHAGEAL REFLUX DISEASE WITHOUT ESOPHAGITIS: Primary | ICD-10-CM

## 2024-07-08 DIAGNOSIS — K59.00 CONSTIPATION, UNSPECIFIED CONSTIPATION TYPE: ICD-10-CM

## 2024-07-08 DIAGNOSIS — F50.89 RESTRICTIVE FOOD INTAKE DISORDER: ICD-10-CM

## 2024-07-08 PROCEDURE — 99212 OFFICE O/P EST SF 10 MIN: CPT | Performed by: NURSE PRACTITIONER

## 2024-07-08 NOTE — PROGRESS NOTES
Surgical History:   Procedure Laterality Date    HERNIA REPAIR  2009    Inguinal Hernia    PILONIDAL CYST EXCISION N/A 11/7/2023    EXCISION PILONIDAL CYST [23108] performed by Kofi Sanchez MD at [facility] OR    UPPER GASTROINTESTINAL ENDOSCOPY N/A 09/19/2023    EGD BIOPSY performed by [facility] Colon MD at [facility]      Family History   Problem Relation Age of Onset    Other Mother         IBS    Anxiety Disorder Mother     Anxiety Disorder Sister     Autism Sister     Anxiety Disorder Brother     Asthma Brother         Lab Results   Component Value Date    WBC 7.8 03/27/2024    HGB 13.9 03/27/2024    HCT 41.3 03/27/2024    MCV 84.6 03/27/2024     03/31/2023      Lab Results   Component Value Date     03/27/2024    K 4.1 03/27/2024     03/27/2024    CO2 29 03/27/2024    BUN 13 03/27/2024    CREATININE 0.8 03/27/2024    GLUCOSE 98 03/27/2024    CALCIUM 9.9 03/27/2024    BILITOT 0.3 03/27/2024    ALKPHOS 74 03/27/2024    AST 21 03/27/2024    ALT 17 03/27/2024    LABGLOM >90 03/27/2024             Assessment & Plan          ASSESSMENT/PLAN:    1. Gastro-esophageal reflux disease without esophagitis    -continue PPI medication as directed  -advised patient to decrease Sucralfate because of constipation  -patient may use Gaviscon for reflux when he is under a stress      2. Constipation, unspecified constipation type    -start Miralax daily as directed    3. Restrictive Food Intake Disorder    -will need to monitor patients weight.  Patient has many food intolerances  -we discussed a nutritional supplement      Return for Follow up in 3 months.    An electronic signature was used to authenticate this note.    --Twila Pittman, APRN - CNP

## 2024-08-02 ENCOUNTER — CLINICAL DOCUMENTATION (OUTPATIENT)
Dept: NUTRITION | Age: 19
End: 2024-08-02

## 2024-08-02 NOTE — PROGRESS NOTES
Nutrition Follow Up Note    Date: 7/31/24  Patient: Antonino Sun  Referring Clinician: Samira Sena MD  Fax: [phone]  Patient's Last Session: 5/28/24  Reason for Visit: Underweight, IBS symptoms    Progress with SMART goals:   Cook at least 1 meal per week. -50%  Consume at least 2 meals per day, Breakfast within 2 hours of waking. -100%  Elimination of gluten for a 3 week period, followed by re-introduction. -100%    Current eating pattern:   Breakfast - Magic Spoon (high protein GF cereal), applesauce, banana  Peanut butter & applesauce  Snack between Breakfast and Lunch - GF protein brownies (20g protein)  1-2 Naked brand protein shakes per day (20g protein)  Lunch- frozen grilled chicken, frozen vegetables (asparagus, green beans, broccoli) or steak & fries with GF seasoning  Dinner- pt's mother's home cooked meals or [employer] frozen GF meals.   Fluids: At least 64 ounces water per day, Gatorade with Miralax    Last visit weight: 105 lb as of 5/1/24 per EMR  CBW: 102 lb 6 oz as of 7/8/24 per EMR  Height: 5'5\"    Notes:   Spoke to pt on the phone. Per pt when reintroduced gluten, it \"sounded the alarms\" causing severe abdominal pain and other GI distress. Pt has since adopted gluten free lifestyle and reports improved GI symptoms and more energy throughout the day since avoiding gluten completely. Pt states that he is being vigilant regarding hidden sources or \"sneaky\" gluten. Pt reports the abdominal pain has improved, but it is still there. Pt reports that he is consuming breakfast within 2 hours of waking, and he has been consuming 3 meals + snacks on most days- as much as he can.     Reports BMs have been more consistent, but still not fully evacuating. Reports takes docusate 2x/day and Miralax 1x/day. States that sometimes a BM takes 45min to 1 hour. Pt has GI visit every 3 months. Reports that he has increased his intake of vegetables. Pt states that he is consuming vegetables but not enough.

## 2024-08-14 ENCOUNTER — HOSPITAL ENCOUNTER (OUTPATIENT)
Dept: CARDIOLOGY | Age: 19
Discharge: HOME | End: 2024-08-16
Attending: INTERNAL MEDICINE
Payer: COMMERCIAL

## 2024-08-14 VITALS — HEIGHT: 64 IN | BODY MASS INDEX: 17.42 KG/M2 | WEIGHT: 102 LBS

## 2024-08-14 DIAGNOSIS — R94.31 ABNORMAL ELECTROCARDIOGRAPHY: ICD-10-CM

## 2024-08-14 PROCEDURE — 93306 TTE W/DOPPLER COMPLETE: CPT

## 2024-08-16 LAB
ECHO AV AREA PEAK VELOCITY: 3.2 CM2
ECHO AV AREA VTI: 3.3 CM2
ECHO AV AREA/BSA PEAK VELOCITY: 2.2 CM2/M2
ECHO AV AREA/BSA VTI: 2.2 CM2/M2
ECHO AV CUSP MM: 1.7 CM
ECHO AV MEAN GRADIENT: 2 MMHG
ECHO AV MEAN VELOCITY: 0.6 M/S
ECHO AV PEAK GRADIENT: 3 MMHG
ECHO AV PEAK VELOCITY: 0.9 M/S
ECHO AV VELOCITY RATIO: 1
ECHO AV VTI: 15.7 CM
ECHO BSA: 1.45 M2
ECHO LA DIAMETER INDEX: 1.77 CM/M2
ECHO LA DIAMETER: 2.6 CM
ECHO LA VOL A-L A2C: 42 ML (ref 18–58)
ECHO LA VOL A-L A4C: 44 ML (ref 18–58)
ECHO LA VOL MOD A2C: 40 ML (ref 18–58)
ECHO LA VOL MOD A4C: 38 ML (ref 18–58)
ECHO LA VOLUME AREA LENGTH: 46 ML
ECHO LA VOLUME INDEX A-L A2C: 29 ML/M2 (ref 16–34)
ECHO LA VOLUME INDEX A-L A4C: 30 ML/M2 (ref 16–34)
ECHO LA VOLUME INDEX AREA LENGTH: 31 ML/M2 (ref 16–34)
ECHO LA VOLUME INDEX MOD A2C: 27 ML/M2 (ref 16–34)
ECHO LA VOLUME INDEX MOD A4C: 26 ML/M2 (ref 16–34)
ECHO LV EF PHYSICIAN: 60 %
ECHO LV EJECTION FRACTION A4C: 54 %
ECHO LV FRACTIONAL SHORTENING: 26 % (ref 28–44)
ECHO LV INTERNAL DIMENSION DIASTOLE INDEX: 3.13 CM/M2
ECHO LV INTERNAL DIMENSION DIASTOLIC: 4.6 CM (ref 4.2–5.9)
ECHO LV INTERNAL DIMENSION SYSTOLIC INDEX: 2.31 CM/M2
ECHO LV INTERNAL DIMENSION SYSTOLIC: 3.4 CM
ECHO LV ISOVOLUMETRIC RELAXATION TIME (IVRT): 86.5 MS
ECHO LV IVSD: 0.8 CM (ref 0.6–1)
ECHO LV IVSS: 1.1 CM
ECHO LV MASS 2D: 108.5 G (ref 88–224)
ECHO LV MASS INDEX 2D: 73.8 G/M2 (ref 49–115)
ECHO LV POSTERIOR WALL DIASTOLIC: 0.7 CM (ref 0.6–1)
ECHO LV POSTERIOR WALL SYSTOLIC: 1.1 CM
ECHO LV RELATIVE WALL THICKNESS RATIO: 0.3
ECHO LVOT AREA: 3.1 CM2
ECHO LVOT AV VTI INDEX: 1.01
ECHO LVOT DIAM: 2 CM
ECHO LVOT MEAN GRADIENT: 2 MMHG
ECHO LVOT PEAK GRADIENT: 3 MMHG
ECHO LVOT PEAK VELOCITY: 0.9 M/S
ECHO LVOT STROKE VOLUME INDEX: 34 ML/M2
ECHO LVOT SV: 49.9 ML
ECHO LVOT VTI: 15.9 CM
ECHO MV "A" WAVE DURATION: 135 MSEC
ECHO MV A VELOCITY: 0.41 M/S
ECHO MV AREA PHT: 4.8 CM2
ECHO MV AREA VTI: 2.5 CM2
ECHO MV E DECELERATION TIME (DT): 244.6 MS
ECHO MV E VELOCITY: 0.68 M/S
ECHO MV E/A RATIO: 1.66
ECHO MV LVOT VTI INDEX: 1.23
ECHO MV MAX VELOCITY: 0.8 M/S
ECHO MV MEAN GRADIENT: 1 MMHG
ECHO MV MEAN VELOCITY: 0.5 M/S
ECHO MV PEAK GRADIENT: 2 MMHG
ECHO MV PRESSURE HALF TIME (PHT): 45.9 MS
ECHO MV VTI: 19.6 CM
ECHO PV MAX VELOCITY: 0.9 M/S
ECHO PV MEAN GRADIENT: 2 MMHG
ECHO PV MEAN VELOCITY: 0.7 M/S
ECHO PV PEAK GRADIENT: 3 MMHG
ECHO PV VTI: 18 CM
ECHO PVEIN A DURATION: 90 MS
ECHO PVEIN A VELOCITY: 0.3 M/S
ECHO PVEIN PEAK D VELOCITY: 0.4 M/S
ECHO PVEIN PEAK S VELOCITY: 0.6 M/S
ECHO PVEIN S/D RATIO: 1.5
ECHO RV INTERNAL DIMENSION: 2.4 CM

## 2024-09-04 ENCOUNTER — OFFICE VISIT (OUTPATIENT)
Dept: NEUROLOGY | Age: 19
End: 2024-09-04
Payer: COMMERCIAL

## 2024-09-04 VITALS
WEIGHT: 95 LBS | SYSTOLIC BLOOD PRESSURE: 128 MMHG | BODY MASS INDEX: 15.83 KG/M2 | HEART RATE: 88 BPM | DIASTOLIC BLOOD PRESSURE: 78 MMHG | HEIGHT: 65 IN

## 2024-09-04 DIAGNOSIS — G43.709 CHRONIC MIGRAINE W/O AURA W/O STATUS MIGRAINOSUS, NOT INTRACTABLE: Primary | ICD-10-CM

## 2024-09-04 PROCEDURE — G8419 CALC BMI OUT NRM PARAM NOF/U: HCPCS

## 2024-09-04 PROCEDURE — 1036F TOBACCO NON-USER: CPT

## 2024-09-04 PROCEDURE — 99204 OFFICE O/P NEW MOD 45 MIN: CPT

## 2024-09-04 PROCEDURE — G8427 DOCREV CUR MEDS BY ELIG CLIN: HCPCS

## 2024-09-04 RX ORDER — SUMATRIPTAN 100 MG/1
100 TABLET, FILM COATED ORAL
Qty: 9 TABLET | Refills: 5 | Status: SHIPPED | OUTPATIENT
Start: 2024-09-04 | End: 2024-09-04

## 2024-09-04 NOTE — PROGRESS NOTES
Twila RICCIARTUR - CNP   busPIRone (BUSPAR) 10 MG tablet TAKE 1 TABLET BY MOUTH TWICE A DAY IN THE MORNING AND IN THE EVENING 2/23/24  Yes Samira Sena MD   atomoxetine (STRATTERA) 40 MG capsule Take by mouth daily 12/12/23  Yes ProviderMadeleine MD   hydrOXYzine HCl (ATARAX) 10 MG tablet TAKE 1 (ONE) TABLET DAILY AS NEEDED FOR ANXIETY 8/30/23  Yes Provider, MD Madeleine   fluvoxaMINE (LUVOX) 100 MG tablet Take 1 tablet by mouth daily 2/20/23  Yes Provider, MD Madeleine       Social History:        reports that he has never smoked. He has never been exposed to tobacco smoke. He has never used smokeless tobacco. He reports that he does not drink alcohol and does not use drugs.        Family History:     Family History   Problem Relation Age of Onset    Other Mother         IBS    Anxiety Disorder Mother     Anxiety Disorder Sister     Autism Sister     Anxiety Disorder Brother     Asthma Brother         History of Present Illness:     Patient has been experiencing migraines since he was 8 years old.  He said he fell off a 10 foot bridge landing on a large rock when he was 8 years old.  He was taken to the emergency room.  He is unsure of his migraines started before or after that occurrence.    He describes his migraines as a constant stabbing aching pressure that is a 9/10 on the pain scale.  He says his migraines either start on his right frontal and radiate to his entire head or start on his left frontal and radiate to his entire head.  His migraines are accompanied with nausea, light and sound sensitivity, sweating, and myalgias.  He is only experiencing approximately 5 migraines per month.  He takes Excedrin Migraine and this only decreases his migraine pain but is not take his migraines away.    He recently had a CT head which was negative for abnormalities    Subjective:     Patient presents alone is an excellent historian.  He is pleasant cooperative.    Patient is interested in starting

## 2024-09-16 ENCOUNTER — TELEPHONE (OUTPATIENT)
Dept: NEUROLOGY | Age: 19
End: 2024-09-16

## 2024-10-07 ENCOUNTER — OFFICE VISIT (OUTPATIENT)
Age: 19
End: 2024-10-07
Payer: COMMERCIAL

## 2024-10-07 VITALS
RESPIRATION RATE: 18 BRPM | HEART RATE: 103 BPM | BODY MASS INDEX: 17.57 KG/M2 | DIASTOLIC BLOOD PRESSURE: 60 MMHG | HEIGHT: 64 IN | OXYGEN SATURATION: 97 % | SYSTOLIC BLOOD PRESSURE: 100 MMHG | TEMPERATURE: 97.2 F | WEIGHT: 102.9 LBS

## 2024-10-07 DIAGNOSIS — K21.9 GASTRO-ESOPHAGEAL REFLUX DISEASE WITHOUT ESOPHAGITIS: Primary | ICD-10-CM

## 2024-10-07 DIAGNOSIS — R10.13 EPIGASTRIC PAIN: ICD-10-CM

## 2024-10-07 DIAGNOSIS — K59.00 CONSTIPATION, UNSPECIFIED CONSTIPATION TYPE: ICD-10-CM

## 2024-10-07 DIAGNOSIS — F50.82 RESTRICTIVE FOOD INTAKE DISORDER: ICD-10-CM

## 2024-10-07 PROCEDURE — G8484 FLU IMMUNIZE NO ADMIN: HCPCS | Performed by: NURSE PRACTITIONER

## 2024-10-07 PROCEDURE — 1036F TOBACCO NON-USER: CPT | Performed by: NURSE PRACTITIONER

## 2024-10-07 PROCEDURE — G8419 CALC BMI OUT NRM PARAM NOF/U: HCPCS | Performed by: NURSE PRACTITIONER

## 2024-10-07 PROCEDURE — G8427 DOCREV CUR MEDS BY ELIG CLIN: HCPCS | Performed by: NURSE PRACTITIONER

## 2024-10-07 PROCEDURE — 99212 OFFICE O/P EST SF 10 MIN: CPT | Performed by: NURSE PRACTITIONER

## 2024-10-07 RX ORDER — CYPROHEPTADINE HYDROCHLORIDE 4 MG/1
4 TABLET ORAL 2 TIMES DAILY PRN
Qty: 60 TABLET | Refills: 5 | Status: SHIPPED | OUTPATIENT
Start: 2024-10-07

## 2024-10-07 RX ORDER — SENNOSIDES A AND B 8.6 MG/1
TABLET, FILM COATED ORAL
Qty: 60 TABLET | Refills: 5 | Status: SHIPPED
Start: 2024-10-07 | End: 2024-11-11 | Stop reason: SDUPTHER

## 2024-10-07 RX ORDER — LANSOPRAZOLE 30 MG/1
30 CAPSULE, DELAYED RELEASE ORAL 2 TIMES DAILY
Qty: 60 CAPSULE | Refills: 5 | Status: SHIPPED
Start: 2024-10-07 | End: 2025-01-30

## 2024-10-08 ENCOUNTER — CLINICAL DOCUMENTATION (OUTPATIENT)
Dept: NUTRITION | Age: 19
End: 2024-10-08

## 2024-10-08 NOTE — PROGRESS NOTES
Nutrition Follow Up Note    Date: 10/8/2024  Patient: Antonino Sun  Referring Clinician: Samira Sena MD  Fax: [phone]  Patient's Last Session: 7/31/24  Reason for Visit: Underweight, IBS symptoms    Progress with SMART goals:   Meal plan and prep Sunday and Wednesday. - 75% you make a list   Use the exercise bike at least 2x per week. - 25%  Cook at least 1 meal per week. - 0%    Current eating pattern:   Breakfast soon after waking: fruit cocktail from a can + spoonful of peanut butter   Sometimes snack: GF protein brownie w/ 20g protein with fruit or chips snack 11 or noon  2-3pm drink Naked protein smoothie for lunch, might have something else but usually not  Fruit snacks or GF bethany crackers   Dinner - what mother makes or [employer] frozen dinner  GF pizza   Ice cream (sometimes causes abdominal pain)  Snack throughout the night sometimes  Lunch ham or GF popcorn chicken, fruit, candy  Grapes strawberries mandarin oranges   Chips and salsa or queso  Usual: fruit cocktail, smoothie, 25% of the time pt does not eat dinner or frozen meal and then will eat a lot at night  Pt states he consumes water throughout the day    Last visit weight: 102 lb 6 ounces  CBW: 102 lb 14.4 oz as of 10/7/24 per EMR  Height: 5'4\"    Notes:   Pt reports that appetite has decreased and that he often will find food that he normally likes as \"gross\" or \"disgusting\" -thinks he will throw up if he eats it. Will not be able to eat for a few hours. Pt reports phobia of vomiting. Pt spoke with a therapist about it and considered different eating disorders, closest his experience comes to is arfid, however is interested in food much of the day and so does not completely apply to pt. Sees a therapist 1x per week. Pt states that he is not eating enough.     Pt reports abdominal pain daily that pt describes as: medium, dull, achy constant, all over, sometimes goes up into esophagus, sharp on lower left. If stomach is hurting and not

## 2024-10-18 ENCOUNTER — APPOINTMENT (OUTPATIENT)
Dept: GENETICS | Facility: CLINIC | Age: 19
End: 2024-10-18
Payer: COMMERCIAL

## 2024-10-18 VITALS
BODY MASS INDEX: 17.38 KG/M2 | DIASTOLIC BLOOD PRESSURE: 77 MMHG | HEIGHT: 64 IN | RESPIRATION RATE: 18 BRPM | HEART RATE: 93 BPM | SYSTOLIC BLOOD PRESSURE: 118 MMHG | WEIGHT: 101.8 LBS

## 2024-10-18 DIAGNOSIS — Z82.79 FAMILY HISTORY OF CONGENITAL HEART DISEASE: ICD-10-CM

## 2024-10-18 DIAGNOSIS — Z82.49 FAMILY HISTORY OF ATRIAL FIBRILLATION: ICD-10-CM

## 2024-10-18 DIAGNOSIS — Z13.79 GENETIC SCREENING: ICD-10-CM

## 2024-10-18 DIAGNOSIS — M25.59 PAIN IN OTHER JOINT: ICD-10-CM

## 2024-10-18 DIAGNOSIS — Z81.8 FAMILY HISTORY OF ANXIETY DISORDER: ICD-10-CM

## 2024-10-18 DIAGNOSIS — M79.10 MYALGIA: ICD-10-CM

## 2024-10-18 DIAGNOSIS — Z82.3 FAMILY HX-STROKE: ICD-10-CM

## 2024-10-18 DIAGNOSIS — Z82.69 FAMILY HISTORY OF SCOLIOSIS: ICD-10-CM

## 2024-10-18 DIAGNOSIS — M24.9 HYPERMOBILE JOINTS: Primary | ICD-10-CM

## 2024-10-18 DIAGNOSIS — Z82.49 FAMILY HX OF HYPERTENSION: ICD-10-CM

## 2024-10-18 DIAGNOSIS — Z83.2 FAMILY HISTORY OF AUTOIMMUNE DISORDER: ICD-10-CM

## 2024-10-18 DIAGNOSIS — Z82.69: ICD-10-CM

## 2024-10-18 PROCEDURE — 99205 OFFICE O/P NEW HI 60 MIN: CPT | Performed by: STUDENT IN AN ORGANIZED HEALTH CARE EDUCATION/TRAINING PROGRAM

## 2024-10-18 PROCEDURE — 3008F BODY MASS INDEX DOCD: CPT | Performed by: STUDENT IN AN ORGANIZED HEALTH CARE EDUCATION/TRAINING PROGRAM

## 2024-10-18 PROCEDURE — 1036F TOBACCO NON-USER: CPT | Performed by: STUDENT IN AN ORGANIZED HEALTH CARE EDUCATION/TRAINING PROGRAM

## 2024-10-18 PROCEDURE — 99417 PROLNG OP E/M EACH 15 MIN: CPT | Performed by: STUDENT IN AN ORGANIZED HEALTH CARE EDUCATION/TRAINING PROGRAM

## 2024-10-18 RX ORDER — HYDROXYZINE HYDROCHLORIDE 10 MG/1
TABLET, FILM COATED ORAL
COMMUNITY
Start: 2024-10-17

## 2024-10-18 RX ORDER — SUMATRIPTAN SUCCINATE 100 MG/1
TABLET ORAL
COMMUNITY
Start: 2024-09-04

## 2024-10-18 RX ORDER — FLUVOXAMINE MALEATE 100 MG/1
TABLET, COATED ORAL
COMMUNITY
Start: 2024-10-17

## 2024-10-18 RX ORDER — LANSOPRAZOLE 30 MG/1
CAPSULE, DELAYED RELEASE ORAL
COMMUNITY
Start: 2024-10-07

## 2024-10-18 RX ORDER — DOCUSATE SODIUM 100 MG/1
1 CAPSULE, LIQUID FILLED ORAL 2 TIMES DAILY PRN
COMMUNITY
Start: 2024-09-04

## 2024-10-18 RX ORDER — BUSPIRONE HYDROCHLORIDE 10 MG/1
TABLET ORAL
COMMUNITY
Start: 2024-09-11

## 2024-10-18 NOTE — PATIENT INSTRUCTIONS
Thank you for visiting the  Genetics Clinic.     Today, we discussed hypermobile Morena-Danlos syndrome (hEDS) and any reasons why you do not currently meet the clinical criteria for hEDS. Questions and concerns were addressed.     The clinic note with full evaluation and today's final recommendations are to be sent to the referring physician/PCP.    Please call [phone] to schedule Genetics follow-up if other concerns arise.    Kyra Wellington MD  Genetic Medicine

## 2024-10-18 NOTE — LETTER
10/18/24    Katie Cueva MD  [address] [facility] [address]      Dear Dr. Katie Cueva MD,    I am writing to confirm that your patient, HANNA Cortez  received care in my office on 10/18/24. I have enclosed a summary of the care provided to HANNA for your reference.    Please contact me with any questions you may have regarding the visit.    Sincerely,         Kyra Wellington MD  [address]  [facility] [address]  [facility] [address]  [phone]    CC: No Recipients

## 2024-10-18 NOTE — PROGRESS NOTES
"  Genetics Department  [address] [place] [address]  P: [phone]  F: [phone]      Subjective:   Reason for Visit:   Fabricio who goes by \"J\" is a 18 y.o. 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 The information for this visit was obtained from the patient and the medical records.    History of Present Illness:    He states that his bones ache and his joint will hurt.  He states that his aches have not been frequent or constant enough to lead to pain medications. He estimates that he has had this for the past 11 years. He states that they happen every couple of weeks and is worse if he is more active like walking around for awhile. He states that the pain is typically in his lower legs, knees, ankles, and sometimes wrists.  No swelling or redness of joints.  He had seen a chiropractors in the past for back pain.  He thought it helped.  He had PT in the past after he sprained his ankle. He had 3 months of PT.    He saw the dermatologist for a bump and was referred to the hand surgeon.  He is planning on seeing a hand surgeon for his right wrist.     Cardiology for chest pain on 8/7/2019, which was thought not to be related to the cardiovascular system and instead vasovagal in nature. Per the note, has a normal exam, ECG, and echocardiogram.  He was evaluated again in 12/16/20 for vasovagal dizziness and per the note had \"reassuring cardiac evaluation and testing.\"  Recommended to increase non-caffeinated fluids.    Follows with GI for constipation.  He states that his GI provider brought up hEDS possibility.    He saw neurology in the past for migraines. He was started on PRN medications. He states that he has headaches in the evenings.     He states that his joint are hypermobile.    No history of unusual fractures, scoliosis, or pectus differences.   He states that his skin is hyperextensible.  He states that he lost a lot " "of weight. When he was depressed, he lost the weight (>10lbs). He states that he is seeing a dietician and recently found out that he has a gluten sensitivity. Since removing the gluten sensitivity, his abdominal pain has improved.   No history of skin fragility, easy bruising, stretch marks not related to weight change, abnormal scar formation or atrophic scars.    He states rheumatology did the at home EDS testing and was told he was hypermobile.      No history of dental crowding.  He states that he had the first stages of periodontitis.   No history of POTS or pneumothorax.  No history of recurrent hernias.    He wears glasses and has myopia.  He states that his one eye got better and one got worse. He can see most of the bottom line without glasses and   No history of lens dislocation or high myopia.    Past Surgical History:  Fabricio  has a past surgical history that includes Hernia repair and Pilonidal cyst drainage.    Behavior History:  He states that he has mental health disorders. He has a counselor and psychiatrist. He states that he has episodic food aversions.      Family History:  A multigenerational family history was obtained as part of the visit and pertinent positives and negatives are reviewed here.  The complete pedigree will be scanned into the patient EHR.   His father is 51yo and has a history of an autoimmune disorder, was \"deaf for a couple of years\" that resolved, and has had surgeries due to falls.  His mother is 43yo and has a history of generalized anxiety, and high BP.  His sister who is 23yo has a history of anxiety, scoliosis that did not require any intervention, and is currently pregnant.  His 20yo sister who was assigned male at birth has a history of an autoimmune disorder.   On the paternal side of the family, his first cousin had a \"disease that stunted her growth.\"  His uncle and paternal grandparents are all A+W.  On the maternal side of the family, his grandfather has a history " of a knee replacement.  His MGM is 65yo and has a history of a congenital heart disorder thought to be an ASD for which she had surgical intervention, HTN, a-fib, and a stroke at 66yo.  His maternal great aunt has a history of a CHD (ASD) that required surgery.  His maternal uncle and maternal first cousins are all A+W.   The remainder of the history is negative for congenital anomalies, blindness, and known genetic diseases.  Consanguinity is denied. Ancestry is white on both sides of the family.    Review of Systems:  A full review of systems was reviewed with the patient.  Pertinent positives listed in the HPI.  All other systems negative.      MEDICATIONS:     Current Outpatient Medications:     busPIRone (Buspar) 10 mg tablet, , Disp: , Rfl:     docusate sodium (Colace) 100 mg capsule, Take 1 capsule (100 mg) by mouth 2 times a day as needed for constipation., Disp: , Rfl:     fLuvoxaMINE (Luvox) 100 mg tablet, , Disp: , Rfl:     hydrOXYzine HCL (Atarax) 10 mg tablet, , Disp: , Rfl:     lansoprazole (Prevacid) 30 mg DR capsule, TAKE 1 CAPSULE BY MOUTH IN THE MORNING AND IN THE EVENING, Disp: , Rfl:     SUMAtriptan (Imitrex) 100 mg tablet, TAKE 1 TABLET BY MOUTH ONCE AS NEEDED FOR MIGRAINE, Disp: , Rfl:     atomoxetine HCl (STRATTERA ORAL), Take by mouth., Disp: , Rfl:     ondansetron HCl (ZOFRAN ORAL), Take by mouth., Disp: , Rfl:     ALLERGIES:   Fabricio has No Known Allergies.     Objective:     Physical Exam  Beighton score  1. Passive dorsiflexion and hyperextension of the fifth MCP joint beyond 90°: 0  2. Passive apposition of the thumb to the flexor aspect of the forearm: 2  3. Passive hyperextension of the elbow beyond 10°: 1 (left elbow)  4. Passive hyperextension of the knee beyond 10°: 0  5. Active forward flexion of the trunk with the knees fully extended so that the palms of the hands rest flat on the floor: 1  Total   4/9    HEENT Normocephalic with normal hair distribution and pattern; symmetric  face; pupils equal, round, and reactive to light bilaterally; glasses present, ears normally formed set and rotated;  normal nose; normal philtrum; palate intact and not narrow or high arched; uvula single and midline.  Symmetric facial movements.  Dentition is present.   Neck Supple with no extra skin or webbing.   Chest Clear to auscultation bilaterally; chest cage symmetric without pectus carinatum; nipples normally formed and spaced.   CV Regular rate and rhythm with no murmurs.    Abdomen Soft, nondistended, NT to palpation; bowel sounds present.   Back Spine appears midline when bending forward.   Extremities Normally formed digits with normal nails and creases; Wrist sign negative. Thumb sign negative. Bilateral piezogenic papules. Hindfoot deformity-, flat feet-.   Skin No visible striae/abnormal scars. Skin hyperextensibility not present using volar surface of nondominant forearm   Neuro Alert       Assessment and Plan:     HANNA is a 18 y.o. 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  His Beighton score today was a 4/9.  Although he has hypermobility of certain joints, he does not have generalized hypermobility.  He does not have a high arched or narrow palate, skin hyperextensibility, unexplained striae, or unusual scars.  He has been evaluated by cardiology in the past multiple times and per the notes, his echocardiograms have been normal (the actual echocardiogram reports were not available to personally review). Discussed that currently we do not have a gene associated with hEDS.  Due to this, there is strict clinical criteria for hEDS.  He does NOT currently meet the clinical criteria for hEDS.  Discussed reasons why.  Of note, his father and sister have a history of an autoimmune disorder. Some patients with autoimmune disorders have myalgias and joint pain. He plans on speaking with his  sister and father about the details of their autoimmune disorders and will look into if he has been evaluated for this.  Offered a referral to PM&R, which was declined at this time.  His maternal grandmother and maternal great aunt have a history of CHD that he believes are ASDs for which he states both required surgical intervention. Briefly discussed the genetic knowledge of CHD.  His MGM and maternal great aunt can consider a genetics evaluation.        We would like Fabricio to follow up in clinic if new questions or concerns arise. An appointment can be made by calling [phone].     The information we discussed is what is known as of this date. With the rapid pace of medical and genetic research, new discoveries may modify our assessment and approach to this patient and/or family in the future.     All of the patient's questions were answered and contact information was provided.       Thank you for involving us with the care of Fabricio.     This was a clinical encounter in which I spent greater than 105 minutes engaged in activities related to this visit which included records review, preparing to see the patient, pedigree analysis, completing the evaluation, counseling, documentation, and coordination.  We discussed the hEDS, genetic principles, and lack of genetic testing options for hEDS.    Kyra Wellington MD  Medical Geneticist

## 2024-11-11 ENCOUNTER — HOSPITAL ENCOUNTER (OUTPATIENT)
Age: 19
Discharge: HOME | End: 2024-11-11
Payer: COMMERCIAL

## 2024-11-11 ENCOUNTER — OFFICE VISIT (OUTPATIENT)
Dept: PRIMARY CARE CLINIC | Age: 19
End: 2024-11-11

## 2024-11-11 VITALS
RESPIRATION RATE: 16 BRPM | HEART RATE: 104 BPM | DIASTOLIC BLOOD PRESSURE: 77 MMHG | SYSTOLIC BLOOD PRESSURE: 122 MMHG | BODY MASS INDEX: 17.93 KG/M2 | TEMPERATURE: 97.8 F | OXYGEN SATURATION: 99 % | WEIGHT: 105 LBS | HEIGHT: 64 IN

## 2024-11-11 DIAGNOSIS — Q87.43: ICD-10-CM

## 2024-11-11 DIAGNOSIS — K59.00 CONSTIPATION, UNSPECIFIED CONSTIPATION TYPE: ICD-10-CM

## 2024-11-11 DIAGNOSIS — K21.9 GASTROESOPHAGEAL REFLUX DISEASE WITHOUT ESOPHAGITIS: ICD-10-CM

## 2024-11-11 DIAGNOSIS — Z23 FLU VACCINE NEED: ICD-10-CM

## 2024-11-11 DIAGNOSIS — F50.82 RESTRICTIVE FOOD INTAKE DISORDER: Primary | ICD-10-CM

## 2024-11-11 DIAGNOSIS — M25.50 HYPERMOBILITY ARTHRALGIA: ICD-10-CM

## 2024-11-11 DIAGNOSIS — M13.0 POLYARTHRITIS: ICD-10-CM

## 2024-11-11 LAB
25(OH)D3 SERPL-MCNC: 20.9 NG/ML (ref 30–100)
ALBUMIN SERPL-MCNC: 5.1 G/DL (ref 3.5–5.2)
ALP SERPL-CCNC: 79 U/L (ref 40–129)
ALT SERPL-CCNC: 8 U/L (ref 0–40)
ANION GAP SERPL CALCULATED.3IONS-SCNC: 12 MMOL/L (ref 7–16)
AST SERPL-CCNC: 16 U/L (ref 0–39)
BASOPHILS # BLD: 0.07 K/UL (ref 0–0.2)
BASOPHILS NFR BLD: 1 % (ref 0–2)
BILIRUB SERPL-MCNC: 0.3 MG/DL (ref 0–1.2)
BUN SERPL-MCNC: 10 MG/DL (ref 6–20)
CALCIUM SERPL-MCNC: 9.9 MG/DL (ref 8.6–10.2)
CHLORIDE SERPL-SCNC: 101 MMOL/L (ref 98–107)
CHOLEST SERPL-MCNC: 104 MG/DL
CO2 SERPL-SCNC: 28 MMOL/L (ref 22–29)
CREAT SERPL-MCNC: 0.8 MG/DL (ref 0.7–1.2)
CRP SERPL HS-MCNC: <3 MG/L (ref 0–5)
EOSINOPHIL # BLD: 0.23 K/UL (ref 0.05–0.5)
EOSINOPHILS RELATIVE PERCENT: 4 % (ref 0–6)
ERYTHROCYTE [DISTWIDTH] IN BLOOD BY AUTOMATED COUNT: 12.3 % (ref 11.5–15)
ERYTHROCYTE [SEDIMENTATION RATE] IN BLOOD BY WESTERGREN METHOD: 2 MM/HR (ref 0–15)
GFR, ESTIMATED: >90 ML/MIN/1.73M2
GLUCOSE SERPL-MCNC: 89 MG/DL (ref 74–99)
HCT VFR BLD AUTO: 43.2 % (ref 37–54)
HDLC SERPL-MCNC: 40 MG/DL
HGB BLD-MCNC: 14 G/DL (ref 12.5–16.5)
IMM GRANULOCYTES # BLD AUTO: <0.03 K/UL (ref 0–0.58)
IMM GRANULOCYTES NFR BLD: 0 % (ref 0–5)
LDLC SERPL CALC-MCNC: 57 MG/DL
LYMPHOCYTES NFR BLD: 1.84 K/UL (ref 1.5–4)
LYMPHOCYTES RELATIVE PERCENT: 30 % (ref 20–42)
MCH RBC QN AUTO: 27.5 PG (ref 26–35)
MCHC RBC AUTO-ENTMCNC: 32.4 G/DL (ref 32–34.5)
MCV RBC AUTO: 84.9 FL (ref 80–99.9)
MONOCYTES NFR BLD: 0.5 K/UL (ref 0.1–0.95)
MONOCYTES NFR BLD: 8 % (ref 2–12)
NEUTROPHILS NFR BLD: 56 % (ref 43–80)
NEUTS SEG NFR BLD: 3.41 K/UL (ref 1.8–7.3)
PLATELET # BLD AUTO: 267 K/UL (ref 130–450)
PMV BLD AUTO: 10.5 FL (ref 7–12)
POTASSIUM SERPL-SCNC: 4.2 MMOL/L (ref 3.5–5)
PROT SERPL-MCNC: 7.8 G/DL (ref 6.4–8.3)
RBC # BLD AUTO: 5.09 M/UL (ref 3.8–5.8)
RHEUMATOID FACT SER NEPH-ACNC: <10 IU/ML (ref 0–13)
SODIUM SERPL-SCNC: 141 MMOL/L (ref 132–146)
TRIGL SERPL-MCNC: 37 MG/DL
TSH SERPL DL<=0.05 MIU/L-ACNC: 1.73 UIU/ML (ref 0.27–4.2)
VLDLC SERPL CALC-MCNC: 7 MG/DL
WBC OTHER # BLD: 6.1 K/UL (ref 4.5–11.5)

## 2024-11-11 PROCEDURE — 86140 C-REACTIVE PROTEIN: CPT

## 2024-11-11 PROCEDURE — 84443 ASSAY THYROID STIM HORMONE: CPT

## 2024-11-11 PROCEDURE — 85652 RBC SED RATE AUTOMATED: CPT

## 2024-11-11 PROCEDURE — 82785 ASSAY OF IGE: CPT

## 2024-11-11 PROCEDURE — 36415 COLL VENOUS BLD VENIPUNCTURE: CPT

## 2024-11-11 PROCEDURE — 86431 RHEUMATOID FACTOR QUANT: CPT

## 2024-11-11 PROCEDURE — 86039 ANTINUCLEAR ANTIBODIES (ANA): CPT

## 2024-11-11 PROCEDURE — 82306 VITAMIN D 25 HYDROXY: CPT

## 2024-11-11 PROCEDURE — 86003 ALLG SPEC IGE CRUDE XTRC EA: CPT

## 2024-11-11 PROCEDURE — 86038 ANTINUCLEAR ANTIBODIES: CPT

## 2024-11-11 PROCEDURE — 86200 CCP ANTIBODY: CPT

## 2024-11-11 PROCEDURE — 85025 COMPLETE CBC W/AUTO DIFF WBC: CPT

## 2024-11-11 PROCEDURE — 80053 COMPREHEN METABOLIC PANEL: CPT

## 2024-11-11 PROCEDURE — 80061 LIPID PANEL: CPT

## 2024-11-11 RX ORDER — SENNOSIDES A AND B 8.6 MG/1
TABLET, FILM COATED ORAL
Qty: 60 TABLET | Refills: 1 | Status: SHIPPED | OUTPATIENT
Start: 2024-11-11

## 2024-11-11 RX ORDER — ERGOCALCIFEROL 1.25 MG/1
50000 CAPSULE, LIQUID FILLED ORAL WEEKLY
Qty: 12 CAPSULE | Refills: 0 | Status: SHIPPED | OUTPATIENT
Start: 2024-11-11

## 2024-11-11 SDOH — ECONOMIC STABILITY: FOOD INSECURITY: WITHIN THE PAST 12 MONTHS, YOU WORRIED THAT YOUR FOOD WOULD RUN OUT BEFORE YOU GOT MONEY TO BUY MORE.: NEVER TRUE

## 2024-11-11 SDOH — ECONOMIC STABILITY: FOOD INSECURITY: WITHIN THE PAST 12 MONTHS, THE FOOD YOU BOUGHT JUST DIDN'T LAST AND YOU DIDN'T HAVE MONEY TO GET MORE.: NEVER TRUE

## 2024-11-11 SDOH — ECONOMIC STABILITY: INCOME INSECURITY: HOW HARD IS IT FOR YOU TO PAY FOR THE VERY BASICS LIKE FOOD, HOUSING, MEDICAL CARE, AND HEATING?: NOT VERY HARD

## 2024-11-11 NOTE — PROGRESS NOTES
[facility]  Family Medicine Outpatient    Patient Care Team:  Samira Sena MD as PCP - General (Family Medicine)  Samira Sena MD as PCP - Empaneled Provider      SUBJECTIVE:  CC: had concerns including Follow-up (Chronic abdominal pain. Following his diet. Pt would like B/W and update on any vaccines he may need.).  HPI:  Antonino Sun is a male 19 y.o. presented to the clinic for an established visit.  Last seen in March of this year. Patient saw genetics department at [facility] 10/18.  He was advised that he does not meet criteria for hEDS based on exam and genetic testing.  Some hypermobility noted but not generalized.  Patient does have chronic joint pain and myalgias. Patient declined at that point PM&R referral. Sees Rheumatology and has been diagnosed with Hypermobility and Polyarthritis.  Last saw GI 10/7.  Patient has food aversions.  He is working with dietitian and seeing a therapist once a week.  Has chronic dull achy abdominal pain.  He is on Protonix 40 mg twice daily, but does not feel this controls his symptoms.  Has tried Omeprazole and Nexium in the past.  Had an EGD and gastric emptying study in 2023.  He is gluten-free.  GI discontinued Protonix and started patient on Prevacid. Also advised on Senokot and Periactin. Has prn Colace and Zofran. Weight is up to 105 lb today.     Review of Systems   Constitutional:  Negative for appetite change, fatigue and fever.   Respiratory:  Negative for cough, shortness of breath and wheezing.    Cardiovascular:  Negative for chest pain and palpitations.   Gastrointestinal:  Negative for abdominal pain, constipation, diarrhea, nausea and vomiting.   Musculoskeletal:  Positive for arthralgias. Negative for gait problem.   Psychiatric/Behavioral:  Negative for self-injury and suicidal ideas.        Outpatient Medications Marked as Taking for the 11/11/24 encounter (Office Visit) with Samira Sena MD   Medication Sig Dispense Refill    senna

## 2024-11-12 LAB — ANA SER QL IA: NEGATIVE

## 2024-11-13 LAB
A ALTERNATA IGE QN: <0.1 KU/L (ref 0–0.34)
A FUMIGATUS IGE QN: <0.1 KU/L (ref 0–0.34)
ALLERGEN BIRCH IGE: <0.1 KU/L (ref 0–0.34)
BANANA IGE QN: <0.1 KU/L (ref 0–0.34)
BERMUDA GRASS IGE QN: <0.1 KU/L (ref 0–0.34)
BOXELDER IGE QN: <0.1 KU/L (ref 0–0.34)
C HERBARUM IGE QN: <0.1 KUL/L (ref 0–0.34)
CALIF WALNUT POLN IGE QN: <0.1 KU/L (ref 0–0.34)
CAT DANDER IGE QN: <0.1 KU/L (ref 0–0.34)
CCP AB SER IA-ACNC: 2 U/ML (ref 0–7)
CMN PIGWEED IGE QN: <0.1 KU/L (ref 0–0.34)
COMMON RAGWEED IGE QN: <0.1 KU/L (ref 0–0.34)
CORN IGE QN: <0.1 KU/L (ref 0–0.34)
COTTONWOOD IGE QN: <0.1 KU/L (ref 0–0.34)
COW MILK IGE QN: <0.1 KU/L (ref 0–0.34)
D FARINAE IGE QN: <0.1 KU/L (ref 0–0.34)
D PTERONYSS IGE QN: <0.1 KU/L (ref 0–0.34)
DOG DANDER IGE QN: <0.1 KU/L (ref 0–0.34)
EGG YOLK IGE QN: <0.1 KU/L (ref 0–0.34)
GLUTEN IGE QN: <0.1 KU/L (ref 0–0.34)
IGE SERPL-ACNC: 25 IU/ML (ref 0–100)
LONDON PLANE IGE QN: <0.1 KU/L (ref 0–0.34)
M RACEMOSUS IGE QN: <0.1 KU/L (ref 0–0.34)
MOUSE EPITH IGE QN: <0.1 KU/L (ref 0–0.34)
MT JUNIPER IGE QN: <0.1 KU/L (ref 0–0.34)
P NOTATUM IGE QN: <0.1 KU/L (ref 0–0.34)
PEANUT IGE QN: <0.1 KU/L (ref 0–0.34)
PECAN/HICK TREE IGE QN: <0.1 KU/L (ref 0–0.34)
ROACH IGE QN: <0.1 KU/L (ref 0–0.34)
SALTWORT IGE QN: <0.1 KU/L (ref 0–0.34)
SHEEP SORREL IGE QN: <0.1 KU/L (ref 0–0.34)
SOYBEAN IGE QN: <0.1 KU/L (ref 0–0.34)
STRAWBERRY IGE QN: <0.1 KU/L (ref 0–0.34)
TIMOTHY IGE QN: <0.1 KU/L (ref 0–0.34)
WHEAT IGE QN: <0.1 KU/L (ref 0–0.34)
WHITE ASH IGE QN: <0.1 KU/L (ref 0–0.34)
WHITE ELM IGE QN: <0.1 KU/L (ref 0–0.34)
WHITE MULBERRY IGE QN: <0.1 KU/L (ref 0–0.34)
WHITE OAK IGE QN: <0.1 KU/L (ref 0–0.34)
WHOLE EGG IGE QN: <0.1 KU/L (ref 0–0.34)

## 2024-11-14 NOTE — PATIENT INSTRUCTIONS
[place] Transportation Resources*  (Call [employer]/211 if need more resources.)       [employer] (CARTS):  What they offer: Public transportation for all of [facility]. Call at least 24 hours in advance to make reservation. Reduced rates for age 65 and over.   Phone Number: [phone] ext [phone]    [facility] TRANSPORTATION:  What they offer: No cost transportation for [place] residents aged 65 and disabled; weekly shopping schedule.  Phone: [phone] choose option for [facility]’s office    [facility] SERVICES TRANSPORTATION:  What they offer: Transportation for [facility] residents aged 60 and over who do not require assistance getting on or off vehicle. Service for medical appointments, congregate meal sites, grocery shopping or social service appointments for most of [facility]. Requires 2 week advanced notice   No cost or donation.  Phone: [phone]    [facility] TRANSPORTATION:  What they offer: Transportation for [facility] residents aged 60 and over or disabled on a weekly schedule for medical appointments, shopping, banking, etc. in [facility] and [place]. No cost  Phone: [phone] [phone]    [facility] DEPARTMENT OF JOB AND FAMILY SERVICES (NET)  What they offer: no cost transportation for persons on traditional Medicaid  Phone number: [phone] ext [phone]    [facility] TRANSPORTATION:  What they offer: Transportation for city residents aged 60 and over for [facility] medical appointments and shopping on a weekly schedule. 24-hour notice required. Cost: donation.  Phone: [phone]  Website: [employer]    [facility] TRANSPORTATION:  What they offer: No cost transportation for local seniors to medical appointments, shopping and social activities within [place] and [facility]. Call day of appointment.  Phone: [phone] option 1  [employer] ([employer])  What they offer: Countywide public

## 2024-11-18 ENCOUNTER — HOSPITAL ENCOUNTER (EMERGENCY)
Age: 19
Discharge: HOME | End: 2024-11-18
Attending: EMERGENCY MEDICINE
Payer: COMMERCIAL

## 2024-11-18 VITALS
RESPIRATION RATE: 16 BRPM | DIASTOLIC BLOOD PRESSURE: 79 MMHG | BODY MASS INDEX: 16.31 KG/M2 | OXYGEN SATURATION: 99 % | HEART RATE: 114 BPM | TEMPERATURE: 97.6 F | WEIGHT: 95 LBS | SYSTOLIC BLOOD PRESSURE: 122 MMHG

## 2024-11-18 DIAGNOSIS — J06.9 VIRAL URI: Primary | ICD-10-CM

## 2024-11-18 LAB
SARS-COV-2 RDRP RESP QL NAA+PROBE: NOT DETECTED
SPECIMEN DESCRIPTION: NORMAL

## 2024-11-18 PROCEDURE — 99283 EMERGENCY DEPT VISIT LOW MDM: CPT

## 2024-11-18 PROCEDURE — 87635 SARS-COV-2 COVID-19 AMP PRB: CPT

## 2024-11-18 RX ORDER — BROMPHENIRAMINE MALEATE, PSEUDOEPHEDRINE HYDROCHLORIDE, AND DEXTROMETHORPHAN HYDROBROMIDE 2; 30; 10 MG/5ML; MG/5ML; MG/5ML
5 SYRUP ORAL 4 TIMES DAILY PRN
Qty: 120 ML | Refills: 0 | Status: SHIPPED | OUTPATIENT
Start: 2024-11-18

## 2024-11-18 ASSESSMENT — ENCOUNTER SYMPTOMS
BACK PAIN: 0
SHORTNESS OF BREATH: 0
EYE DISCHARGE: 0
SORE THROAT: 0
WHEEZING: 0
EYE PAIN: 0
NAUSEA: 0
ABDOMINAL PAIN: 0
EYE REDNESS: 0
DIARRHEA: 0
COUGH: 1
RHINORRHEA: 1
VOMITING: 0
SINUS PRESSURE: 0

## 2024-11-18 NOTE — ED PROVIDER NOTES
The history is provided by the patient.   Cold Symptoms  Presenting symptoms: congestion, cough and rhinorrhea    Presenting symptoms: no ear pain, no fatigue, no fever and no sore throat    Severity:  Moderate  Onset quality:  Sudden  Duration:  1 day  Chronicity:  New  Associated symptoms: no arthralgias, no headaches and no wheezing         Review of Systems   Constitutional:  Negative for chills, fatigue and fever.   HENT:  Positive for congestion and rhinorrhea. Negative for ear pain, sinus pressure and sore throat.    Eyes:  Negative for pain, discharge and redness.   Respiratory:  Positive for cough. Negative for shortness of breath and wheezing.    Cardiovascular:  Negative for chest pain.   Gastrointestinal:  Negative for abdominal pain, diarrhea, nausea and vomiting.   Genitourinary:  Negative for dysuria and frequency.   Musculoskeletal:  Negative for arthralgias and back pain.   Skin:  Negative for rash and wound.   Neurological:  Negative for weakness and headaches.   Hematological:  Negative for adenopathy.   All other systems reviewed and are negative.       Physical Exam  Vitals and nursing note reviewed.   Constitutional:       Appearance: He is well-developed.   HENT:      Head: Normocephalic and atraumatic.      Jaw: No trismus.      Right Ear: Hearing, ear canal and external ear normal. Tympanic membrane is retracted.      Left Ear: Hearing, ear canal and external ear normal. Tympanic membrane is retracted.      Nose: Mucosal edema and congestion present.      Right Sinus: No maxillary sinus tenderness or frontal sinus tenderness.      Left Sinus: No maxillary sinus tenderness or frontal sinus tenderness.      Mouth/Throat:      Pharynx: Uvula midline. No uvula swelling.   Eyes:      General: Lids are normal.      Conjunctiva/sclera: Conjunctivae normal.      Pupils: Pupils are equal, round, and reactive to light.   Cardiovascular:      Rate and Rhythm: Normal rate and regular rhythm.

## 2024-11-21 ENCOUNTER — OFFICE VISIT (OUTPATIENT)
Dept: NEUROLOGY | Age: 19
End: 2024-11-21
Payer: COMMERCIAL

## 2024-11-21 VITALS
BODY MASS INDEX: 17.75 KG/M2 | DIASTOLIC BLOOD PRESSURE: 68 MMHG | WEIGHT: 104 LBS | TEMPERATURE: 98.5 F | OXYGEN SATURATION: 99 % | HEART RATE: 76 BPM | SYSTOLIC BLOOD PRESSURE: 101 MMHG | RESPIRATION RATE: 18 BRPM | HEIGHT: 64 IN

## 2024-11-21 DIAGNOSIS — G43.709 CHRONIC MIGRAINE W/O AURA W/O STATUS MIGRAINOSUS, NOT INTRACTABLE: Primary | ICD-10-CM

## 2024-11-21 PROCEDURE — G8427 DOCREV CUR MEDS BY ELIG CLIN: HCPCS

## 2024-11-21 PROCEDURE — G8484 FLU IMMUNIZE NO ADMIN: HCPCS

## 2024-11-21 PROCEDURE — 1036F TOBACCO NON-USER: CPT

## 2024-11-21 PROCEDURE — G8419 CALC BMI OUT NRM PARAM NOF/U: HCPCS

## 2024-11-21 PROCEDURE — 99214 OFFICE O/P EST MOD 30 MIN: CPT

## 2024-11-21 RX ORDER — SUMATRIPTAN SUCCINATE 100 MG/1
100 TABLET ORAL
Qty: 9 TABLET | Refills: 11 | Status: SHIPPED | OUTPATIENT
Start: 2024-11-21 | End: 2024-11-21

## 2024-11-21 NOTE — PROGRESS NOTES
[facility]  NEUROLOGY  Davie Álvarez, DNP, APRN, CNS  Ravi Tamez, MSN, APRN-FNP-C  Alison Ring MSN, APRN, FNP-C  Uma Moyer MSPAS, PA-C  Geovanna Burns MSN, APRN, FNP-C  Kristen Hirsch, MSN, APRN, FNP-BC  [address] [id number]  Phone: [phone]  Fax: [phone]       Antonino Sun is a 19 y.o. right handed male     Patient presents to the neurology clinic for evaluation management of his migraines    PMH of ADHD, depression, OCD, and social anxiety disorder    Assessment:     Chronic migraines without aura  Experiencing migraines since he was 8 years old  Has tried Excedrin Migraine without relief  Experiencing approximately 5 migraines per month  Maintained on sumatriptan, and his migraines dissipate within an hour if he takes this when his migraines begin    Plan:     Continue sumatriptan 100 mg as needed  MRI brain  Continue to maintain a  headache diary  Follow-up in 4 months    History of Present Illness:     Patient has been experiencing migraines since he was 8 years old however he feels he might have been as young as 1 years old when this occurred.  He said he fell off a 10 foot bridge landing on a large rock when he was 8 years old.  He was taken to the emergency room.  He is unsure of his migraines started before or after that occurrence.    He describes his migraines as a constant stabbing aching pressure that is a 9/10 on the pain scale.  He says his migraines either start on his right frontal and radiate to his entire head or start on his left frontal and radiate to his entire head.  His migraines are accompanied with nausea, light and sound sensitivity, sweating, and myalgias.  He is only experiencing approximately 5 migraines per month.  He takes Excedrin Migraine and this only decreases his migraine pain but is not take his migraines away.    He recently had a CT head which was negative for abnormalities    Subjective:     Patient presents

## 2024-11-25 ENCOUNTER — OFFICE VISIT (OUTPATIENT)
Dept: PRIMARY CARE CLINIC | Age: 19
End: 2024-11-25
Payer: COMMERCIAL

## 2024-11-25 VITALS
BODY MASS INDEX: 17.75 KG/M2 | TEMPERATURE: 98.1 F | WEIGHT: 104 LBS | RESPIRATION RATE: 16 BRPM | HEART RATE: 80 BPM | HEIGHT: 64 IN | SYSTOLIC BLOOD PRESSURE: 120 MMHG | OXYGEN SATURATION: 99 % | DIASTOLIC BLOOD PRESSURE: 80 MMHG

## 2024-11-25 DIAGNOSIS — H66.003 NON-RECURRENT ACUTE SUPPURATIVE OTITIS MEDIA OF BOTH EARS WITHOUT SPONTANEOUS RUPTURE OF TYMPANIC MEMBRANES: Primary | ICD-10-CM

## 2024-11-25 PROCEDURE — G8419 CALC BMI OUT NRM PARAM NOF/U: HCPCS | Performed by: NURSE PRACTITIONER

## 2024-11-25 PROCEDURE — 1036F TOBACCO NON-USER: CPT | Performed by: NURSE PRACTITIONER

## 2024-11-25 PROCEDURE — G8484 FLU IMMUNIZE NO ADMIN: HCPCS | Performed by: NURSE PRACTITIONER

## 2024-11-25 PROCEDURE — 99213 OFFICE O/P EST LOW 20 MIN: CPT | Performed by: NURSE PRACTITIONER

## 2024-11-25 PROCEDURE — G8427 DOCREV CUR MEDS BY ELIG CLIN: HCPCS | Performed by: NURSE PRACTITIONER

## 2024-11-25 RX ORDER — AMOXICILLIN 875 MG/1
875 TABLET, COATED ORAL 2 TIMES DAILY
Qty: 20 TABLET | Refills: 0 | Status: SHIPPED | OUTPATIENT
Start: 2024-11-25 | End: 2024-12-05

## 2024-11-25 RX ORDER — AMOXICILLIN 875 MG/1
875 TABLET, COATED ORAL 2 TIMES DAILY
Qty: 20 TABLET | Refills: 0 | Status: SHIPPED
Start: 2024-11-25 | End: 2024-11-25

## 2024-11-25 RX ORDER — METHYLPREDNISOLONE 4 MG/1
TABLET ORAL
Qty: 1 KIT | Refills: 0 | Status: SHIPPED
Start: 2024-11-25 | End: 2024-11-25

## 2024-11-25 RX ORDER — METHYLPREDNISOLONE 4 MG/1
TABLET ORAL
Qty: 1 KIT | Refills: 0 | Status: SHIPPED | OUTPATIENT
Start: 2024-11-25

## 2024-11-25 NOTE — PROGRESS NOTES
Chief Complaint:   Cough (Cough, congestion x1 week and Lt otalgia/fluid x4 days.)    History of Present Illness   Source of history provided by:  patient.     Antonino Sun is a 19 y.o. old male presenting to walk-in for evaluation of left ear pain x 7 days. Reports associated URI symptoms. Denies any discharge from the ear canal. Has tried taking nothing for the symptoms. Denies any fever, chills, CP, SOB, abdominal pain, neck stiffness, rash, or lethargy.    Review of Systems   Unless otherwise stated in this report or unable to obtain because of the patient's clinical or mental status as evidenced by the medical record, this patients's positive and negative responses for Review of Systems, constitutional, psych, eyes, ENT, cardiovascular, respiratory, gastrointestinal, neurological, genitourinary, musculoskeletal, integument systems and systems related to the presenting problem are either stated in the preceding or were not pertinent or were negative for the symptoms and/or complaints related to the medical problem.    Past Medical History:  has a past medical history of ADHD, Chronic depression, OCD (obsessive compulsive disorder), and Social anxiety disorder.   Past Surgical History:  has a past surgical history that includes hernia repair (2009); Upper gastrointestinal endoscopy (N/A, 09/19/2023); and Pilonidal cyst excision (N/A, 11/7/2023).  Social History:  reports that he has never smoked. He has never been exposed to tobacco smoke. He has never used smokeless tobacco. He reports current alcohol use. He reports that he does not use drugs.  Family History: family history includes Anxiety Disorder in his brother, mother, and sister; Asthma in his brother; Autism in his sister; Other in his mother.  Allergies: Patient has no known allergies.    Physical Exam   Vital Signs:  /80   Pulse 80   Temp 98.1 °F (36.7 °C) (Oral)   Resp 16   Ht 1.626 m (5' 4\")   Wt 47.2 kg (104 lb)   SpO2 99%   BMI

## 2024-12-05 ENCOUNTER — TELEPHONE (OUTPATIENT)
Dept: PRIMARY CARE CLINIC | Age: 19
End: 2024-12-05

## 2024-12-05 NOTE — TELEPHONE ENCOUNTER
Lavern from Dr Chi's office LM stating she doesn't treat for Hypermobility arthralgia and they are closing the referral.

## 2025-01-16 ENCOUNTER — TELEMEDICINE (OUTPATIENT)
Dept: GENETICS | Facility: HOSPITAL | Age: 20
End: 2025-01-16
Payer: COMMERCIAL

## 2025-01-16 DIAGNOSIS — M25.59 PAIN IN OTHER JOINT: ICD-10-CM

## 2025-01-16 DIAGNOSIS — Z13.79 GENETIC SCREENING: Primary | ICD-10-CM

## 2025-01-16 DIAGNOSIS — M79.10 MYALGIA: ICD-10-CM

## 2025-01-16 DIAGNOSIS — M24.9 HYPERMOBILE JOINTS: ICD-10-CM

## 2025-01-16 NOTE — PATIENT INSTRUCTIONS
Thank you for visiting the  Genetics Clinic.     Today, we discussed Marfanoid Hypermobility Syndrome and how your features differ.    The clinic note with full evaluation and today's final recommendations are to be sent to the referring physician/PCP.    Please call [phone] to schedule Genetics follow-up if other concerns arise.    Kyra Wellington MD  Genetic Medicine

## 2025-01-16 NOTE — LETTER
"January 16, 2025     Katie Cueva MD  [address] [facility] [address]    Patient: HANNA Cortez   YOB: 2005   Date of Visit: 1/16/2025       Dear Dr. Katie Cueva MD:    Thank you for referring HANNA Cortez to me for evaluation. Below are my notes for this consultation.  If you have questions, please do not hesitate to call me.       Sincerely,     Kyra Wellington MD      CC: No Recipients  ______________________________________________________________________________________      Genetics Department  [address] [place] [id number]  P: [phone]  F: [phone]       Reason for Visit:   Fabricio \"SHONA" is a 19 y.o. male who presents to Genetics clinic for follow-up for his history of chronic joint pain.  The information for this visit was obtained from the patient and the medical records.    A telemedicine visit was performed in lieu of an in office face-to-face visit. The patient has acknowledged the limitations associated with the telemedicine visit and joined the visit from their home. All questions were answered, and the patient understands that we will defer an in-person physical assessment. I provided this service while I was located in [facility] to Fabricio Cortez who was located in [place].  Type of Connection: Live Two-Way Audio with Video    History of Present Illness: Fabricio was last seen in genetics clinic on 10/18/24.  Please refer to that note for more details. 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  His Beighton score was a 4 out of 9 at that visit.  It was determined that he did not have generalized hypermobility and did not have the diagnosis of hEDS.  He had a normal ECHO, ECG, and exam in the past per his notes.     He states that he saw his rheumatologist from an outside organization. He states that he was informed that he had marfanoid hypermobility " syndrome and polyarthritis.  He had called the rheumatologist to ask about these diagnosis and recommended that he speak with genetics.     Past Medical History:  Fabricio  has a past surgical history that includes Hernia repair and Pilonidal cyst drainage.    Social History: Lives with his parents.   He started a new job as a .  He states that he had to reschedule his counselor visits due to his new job.      Family History: Family history reviewed and updated on 01/16/25.  His sister had a female baby about a month old who is being watched for weight gain. His dad and sister who was AMAB have a history IgA deficiency. No interval changes.    Review of Systems:  A full review of systems was reviewed with the patient. He states that his left arm hurts intermittently in the upper arm and shoulder area.  He states that his pain woke him up and sharp. When he opened the car door with that arm, which made the pain worse. This has started this week. Pertinent positives listed in the HPI.  All other systems negative.      MEDICATIONS:     Current Outpatient Medications:   •  atomoxetine HCl (STRATTERA ORAL), Take by mouth., Disp: , Rfl:   •  busPIRone (Buspar) 10 mg tablet, , Disp: , Rfl:   •  docusate sodium (Colace) 100 mg capsule, Take 1 capsule (100 mg) by mouth 2 times a day as needed for constipation., Disp: , Rfl:   •  fLuvoxaMINE (Luvox) 100 mg tablet, , Disp: , Rfl:   •  hydrOXYzine HCL (Atarax) 10 mg tablet, , Disp: , Rfl:   •  lansoprazole (Prevacid) 30 mg DR capsule, TAKE 1 CAPSULE BY MOUTH IN THE MORNING AND IN THE EVENING, Disp: , Rfl:   •  ondansetron HCl (ZOFRAN ORAL), Take by mouth., Disp: , Rfl:   •  SUMAtriptan (Imitrex) 100 mg tablet, TAKE 1 TABLET BY MOUTH ONCE AS NEEDED FOR MIGRAINE, Disp: , Rfl:     ALLERGIES:   Fabricio has No Known Allergies.     Objective:       Physical Exam    HEENT Normocephalic with normal hair distribution and pattern; symmetric face; wearing glasses; ears normally set;  "normal nose.  Symmetric facial movements.  Dentition is present.   Neck Supple with no extra skin or webbing.   Chest No SOB   CV Unable to perform due to the nature of a telehealth visit.   Abdomen Soft to self-palpation, nondistended.   Extremities Normally formed digits with normal nails and creases; moves upper extremities equally   Skin No visible areas of hyper or hypopigmentation or rashes.     Neuro Alert, oriented       Assessment and Plan:     HANNA is a 19 y.o. young man with a history of chronic joint pain and myagias.  At the last visit, it was determined that he did not have generalized hypermobility and did not have the diagnosis of hEDS. He returns to genetics clinic with questions regarding marfanoid hypermobility syndrome as he states that his rheumatologist informed him that he had marfanoid hypermobility syndrome and polyarthritis.  Discussed that there is currently no gene associated with marfanoid hypermobility syndrome (BRUNO [id number]).  From the reported patients that had Marfanoid Hypermobility syndrome, there have been signs of \"very marked joint hypermobility and excessive stretchability of the skin.\"  Patients with Marfanoid Hypermobility syndrome have a marfanoid habitus and cardiac abnormalities.  HANNA does not have a marfanoid habitus and per his prior notes, has had a normal ECHO.  He does not have skin hyperextensibility when we tested the volar surface of his nondominant forearm previously.  He does not have a pectus excavatum or carinatum.  He does not have the collection features that have been described with patients who have Marfanoid Hypermobility syndrome.    Recommended that he see his PCP regarding his acute left arm pain that has been getting worse. His voiced understanding and plans on making an appointment with his PCP.        The information we discussed is what is known as of this date. With the rapid pace of medical and genetic research, new discoveries may modify our assessment " and approach to this patient and/or family in the future.   We would like to see Fabricio back in clinic if new questions or concerns arise. An appointment can be made by calling [phone].    All of the patient's questions were answered and contact information was provided.     Thank you for involving us with the care of Fabricio.      This was a clinical encounter in which I spent greater than 40 minutes engaged in activities related to this visit which included records review, preparing to see the patient, pedigree analysis, completing the evaluation, counseling, documentation, and coordination.  We discussed Marfanoid Hypermobility Syndrome, the lack of genetic testing for this and how his features differ from the patients who were diagnosed with Marfanoid Hypermobility Syndrome.    Kyra Wellington MD  Medical Geneticist

## 2025-01-16 NOTE — PROGRESS NOTES
"  Genetics Department  [address] [place] [address]  P: [phone]  F: [phone]       Reason for Visit:   Fabricio \"HANNA\" is a 19 y.o. male who presents to Genetics clinic for follow-up for his history of chronic joint pain.  The information for this visit was obtained from the patient and the medical records.    A telemedicine visit was performed in lieu of an in office face-to-face visit. The patient has acknowledged the limitations associated with the telemedicine visit and joined the visit from their home. All questions were answered, and the patient understands that we will defer an in-person physical assessment. I provided this service while I was located in [facility] to Fabricio Cortez who was located in [place].  Type of Connection: Live Two-Way Audio with Video    History of Present Illness: Fabricio was last seen in genetics clinic on 10/18/24.  Please refer to that note for more details. 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  His Beighton score was a 4 out of 9 at that visit.  It was determined that he did not have generalized hypermobility and did not have the diagnosis of hEDS.  He had a normal ECHO, ECG, and exam in the past per his notes.     He states that he saw his rheumatologist from an outside organization. He states that he was informed that he had marfanoid hypermobility syndrome and polyarthritis.  He had called the rheumatologist to ask about these diagnosis and recommended that he speak with genetics.     Past Medical History:  Fabricio  has a past surgical history that includes Hernia repair and Pilonidal cyst drainage.    Social History: Lives with his parents.   He started a new job as a .  He states that he had to reschedule his counselor visits due to his new job.      Family History: Family history reviewed and updated on 01/16/25.  His sister had a female baby about a " month old who is being watched for weight gain. His dad and sister who was AMAB have a history IgA deficiency. No interval changes.    Review of Systems:  A full review of systems was reviewed with the patient. He states that his left arm hurts intermittently in the upper arm and shoulder area.  He states that his pain woke him up and sharp. When he opened the car door with that arm, which made the pain worse. This has started this week. Pertinent positives listed in the HPI.  All other systems negative.      MEDICATIONS:     Current Outpatient Medications:     atomoxetine HCl (STRATTERA ORAL), Take by mouth., Disp: , Rfl:     busPIRone (Buspar) 10 mg tablet, , Disp: , Rfl:     docusate sodium (Colace) 100 mg capsule, Take 1 capsule (100 mg) by mouth 2 times a day as needed for constipation., Disp: , Rfl:     fLuvoxaMINE (Luvox) 100 mg tablet, , Disp: , Rfl:     hydrOXYzine HCL (Atarax) 10 mg tablet, , Disp: , Rfl:     lansoprazole (Prevacid) 30 mg DR capsule, TAKE 1 CAPSULE BY MOUTH IN THE MORNING AND IN THE EVENING, Disp: , Rfl:     ondansetron HCl (ZOFRAN ORAL), Take by mouth., Disp: , Rfl:     SUMAtriptan (Imitrex) 100 mg tablet, TAKE 1 TABLET BY MOUTH ONCE AS NEEDED FOR MIGRAINE, Disp: , Rfl:     ALLERGIES:   Fabricio has No Known Allergies.     Objective:       Physical Exam    HEENT Normocephalic with normal hair distribution and pattern; symmetric face; wearing glasses; ears normally set; normal nose.  Symmetric facial movements.  Dentition is present.   Neck Supple with no extra skin or webbing.   Chest No SOB   CV Unable to perform due to the nature of a telehealth visit.   Abdomen Soft to self-palpation, nondistended.   Extremities Normally formed digits with normal nails and creases; moves upper extremities equally   Skin No visible areas of hyper or hypopigmentation or rashes.     Neuro Alert, oriented       Assessment and Plan:     HANNA is a 19 y.o. young man with a history of chronic joint pain and myagias.   "At the last visit, it was determined that he did not have generalized hypermobility and did not have the diagnosis of hEDS. He returns to genetics clinic with questions regarding marfanoid hypermobility syndrome as he states that his rheumatologist informed him that he had marfanoid hypermobility syndrome and polyarthritis.  Discussed that there is currently no gene associated with marfanoid hypermobility syndrome (BRUNO [id number]).  From the reported patients that had Marfanoid Hypermobility syndrome, there have been signs of \"very marked joint hypermobility and excessive stretchability of the skin.\"  Patients with Marfanoid Hypermobility syndrome have a marfanoid habitus and cardiac abnormalities.  HANNA does not have a marfanoid habitus and per his prior notes, has had a normal ECHO.  He does not have skin hyperextensibility when we tested the volar surface of his nondominant forearm previously.  He does not have a pectus excavatum or carinatum.  He does not have the collection features that have been described with patients who have Marfanoid Hypermobility syndrome.    Recommended that he see his PCP regarding his acute left arm pain that has been getting worse. His voiced understanding and plans on making an appointment with his PCP.        The information we discussed is what is known as of this date. With the rapid pace of medical and genetic research, new discoveries may modify our assessment and approach to this patient and/or family in the future.   We would like to see Fabricio back in clinic if new questions or concerns arise. An appointment can be made by calling [phone].    All of the patient's questions were answered and contact information was provided.     Thank you for involving us with the care of Fabricio.      This was a clinical encounter in which I spent greater than 40 minutes engaged in activities related to this visit which included records review, preparing to see the patient, pedigree analysis, " completing the evaluation, counseling, documentation, and coordination.  We discussed Marfanoid Hypermobility Syndrome, the lack of genetic testing for this and how his features differ from the patients who were diagnosed with Marfanoid Hypermobility Syndrome.    Kyra Wellington MD  Medical Geneticist

## 2025-01-20 ENCOUNTER — OFFICE VISIT (OUTPATIENT)
Age: 20
End: 2025-01-20
Payer: COMMERCIAL

## 2025-01-20 VITALS
HEART RATE: 83 BPM | SYSTOLIC BLOOD PRESSURE: 112 MMHG | OXYGEN SATURATION: 98 % | HEIGHT: 64 IN | TEMPERATURE: 97 F | DIASTOLIC BLOOD PRESSURE: 70 MMHG | WEIGHT: 97.6 LBS | BODY MASS INDEX: 16.66 KG/M2

## 2025-01-20 DIAGNOSIS — K21.9 GASTRO-ESOPHAGEAL REFLUX DISEASE WITHOUT ESOPHAGITIS: Primary | ICD-10-CM

## 2025-01-20 DIAGNOSIS — K59.00 CONSTIPATION, UNSPECIFIED CONSTIPATION TYPE: ICD-10-CM

## 2025-01-20 DIAGNOSIS — F50.82 RESTRICTIVE FOOD INTAKE DISORDER: ICD-10-CM

## 2025-01-20 DIAGNOSIS — R10.9 ABDOMINAL PAIN, UNSPECIFIED ABDOMINAL LOCATION: ICD-10-CM

## 2025-01-20 PROCEDURE — G8419 CALC BMI OUT NRM PARAM NOF/U: HCPCS | Performed by: NURSE PRACTITIONER

## 2025-01-20 PROCEDURE — G8427 DOCREV CUR MEDS BY ELIG CLIN: HCPCS | Performed by: NURSE PRACTITIONER

## 2025-01-20 PROCEDURE — 1036F TOBACCO NON-USER: CPT | Performed by: NURSE PRACTITIONER

## 2025-01-20 PROCEDURE — 99212 OFFICE O/P EST SF 10 MIN: CPT | Performed by: NURSE PRACTITIONER

## 2025-01-20 NOTE — PROGRESS NOTES
Antonino Sun (:  2005) is a 19 y.o. male, here for evaluation of the following chief complaint(s):  Weight Loss      SUBJECTIVE/OBJECTIVE:  HPI:    Antonino is a very pleasant 19 year old gentleman that presents today  for follow up on weight loss, constipation, and abdominal pain    Patient tells me that he has been \"up and down\"  Mentions that he may have had a \"stricture\" for awhile   Had 2 weeks of feeling like \"my esophagus was closed up\"  Over the last week, the dysphagia has resolved  Gaviscon did not satisfy  Taking Lansoprazole 30 mg twice daily.  Had some breakthrough a month ago but this has improved  Not a smoker and no alcohol intake    The abdominal pain has resolved.    The constipation is the same.  Tells me that he forgets to use the bathroom  Takes docusate daily but it causes \"bad gas\"  Having a BM about every other day.  Stools are on the \"harder side\"    Patient tells me that he has been eating \"more\" as of recent  Breakfast is usually a cup of cheerios and a KIND bar, lunch is a handful of yogurt covered raisins, fruit snacks or chips, and dinner is usually a frozen meal.  Patient is gluten free.    Patients BMI 16.75    EGD in -  The mucosa of the esophagus appeared normal.   No stricture, ulceration, or inflammation was appreciated.   The Z-line was regular and found at 38cm.  No hiatal hernia was appreciated.      Food was appreciated within the stomach. Food content was noted to be fibrous and fatty.  The mucosa of the stomach appeared overall normal.  A mild, reactive-appearing gastritis with erythema and granularity was appreciated in the antrum.   No erosion or ulceration was appreciated in the body, antrum, or pylorus.  Biopsies for H. pylori were obtained.  The fundus and cardia were carefully inspected in retroflexion and showed normal mucosa as well as food debris.     The mucosa of the duodenum appeared normal.   No stricture, inflammation, erosion, or ulceration was

## 2025-01-21 ENCOUNTER — OFFICE VISIT (OUTPATIENT)
Dept: FAMILY MEDICINE CLINIC | Age: 20
End: 2025-01-21

## 2025-01-21 VITALS
DIASTOLIC BLOOD PRESSURE: 74 MMHG | BODY MASS INDEX: 17.69 KG/M2 | OXYGEN SATURATION: 99 % | WEIGHT: 103.6 LBS | HEIGHT: 64 IN | SYSTOLIC BLOOD PRESSURE: 104 MMHG | HEART RATE: 105 BPM | TEMPERATURE: 98 F | RESPIRATION RATE: 18 BRPM

## 2025-01-21 DIAGNOSIS — M25.512 LEFT SHOULDER PAIN, UNSPECIFIED CHRONICITY: ICD-10-CM

## 2025-01-21 DIAGNOSIS — M79.641 RIGHT HAND PAIN: ICD-10-CM

## 2025-01-21 DIAGNOSIS — M13.0 POLYARTHRITIS: ICD-10-CM

## 2025-01-21 DIAGNOSIS — R63.4 WEIGHT LOSS: ICD-10-CM

## 2025-01-21 DIAGNOSIS — M25.50 HYPERMOBILITY ARTHRALGIA: ICD-10-CM

## 2025-01-21 DIAGNOSIS — F39 MOOD DISORDER (HCC): Primary | ICD-10-CM

## 2025-01-21 DIAGNOSIS — K21.9 GASTROESOPHAGEAL REFLUX DISEASE, UNSPECIFIED WHETHER ESOPHAGITIS PRESENT: ICD-10-CM

## 2025-01-21 DIAGNOSIS — R45.851 SUICIDAL IDEATIONS: ICD-10-CM

## 2025-01-21 DIAGNOSIS — M67.431 GANGLION CYST OF WRIST, RIGHT: ICD-10-CM

## 2025-01-21 RX ORDER — LANSOPRAZOLE 30 MG/1
30 CAPSULE, DELAYED RELEASE ORAL 2 TIMES DAILY
Qty: 60 CAPSULE | Refills: 5 | Status: CANCELLED | OUTPATIENT
Start: 2025-01-21

## 2025-01-21 RX ORDER — ONDANSETRON 4 MG/1
4 TABLET, FILM COATED ORAL DAILY PRN
Qty: 30 TABLET | Refills: 1 | Status: CANCELLED | OUTPATIENT
Start: 2025-01-21

## 2025-01-21 SDOH — ECONOMIC STABILITY: FOOD INSECURITY: WITHIN THE PAST 12 MONTHS, YOU WORRIED THAT YOUR FOOD WOULD RUN OUT BEFORE YOU GOT MONEY TO BUY MORE.: SOMETIMES TRUE

## 2025-01-21 SDOH — ECONOMIC STABILITY: FOOD INSECURITY: WITHIN THE PAST 12 MONTHS, THE FOOD YOU BOUGHT JUST DIDN'T LAST AND YOU DIDN'T HAVE MONEY TO GET MORE.: SOMETIMES TRUE

## 2025-01-21 ASSESSMENT — PATIENT HEALTH QUESTIONNAIRE - PHQ9
SUM OF ALL RESPONSES TO PHQ QUESTIONS 1-9: 0
SUM OF ALL RESPONSES TO PHQ QUESTIONS 1-9: 0
7. TROUBLE CONCENTRATING ON THINGS, SUCH AS READING THE NEWSPAPER OR WATCHING TELEVISION: MORE THAN HALF THE DAYS
2. FEELING DOWN, DEPRESSED OR HOPELESS: MORE THAN HALF THE DAYS
8. MOVING OR SPEAKING SO SLOWLY THAT OTHER PEOPLE COULD HAVE NOTICED. OR THE OPPOSITE, BEING SO FIGETY OR RESTLESS THAT YOU HAVE BEEN MOVING AROUND A LOT MORE THAN USUAL: SEVERAL DAYS
SUM OF ALL RESPONSES TO PHQ QUESTIONS 1-9: 0
2. FEELING DOWN, DEPRESSED OR HOPELESS: NOT AT ALL
4. FEELING TIRED OR HAVING LITTLE ENERGY: NEARLY EVERY DAY
1. LITTLE INTEREST OR PLEASURE IN DOING THINGS: SEVERAL DAYS
SUM OF ALL RESPONSES TO PHQ QUESTIONS 1-9: 17
5. POOR APPETITE OR OVEREATING: NEARLY EVERY DAY
3. TROUBLE FALLING OR STAYING ASLEEP: NEARLY EVERY DAY
9. THOUGHTS THAT YOU WOULD BE BETTER OFF DEAD, OR OF HURTING YOURSELF: SEVERAL DAYS
6. FEELING BAD ABOUT YOURSELF - OR THAT YOU ARE A FAILURE OR HAVE LET YOURSELF OR YOUR FAMILY DOWN: SEVERAL DAYS
10. IF YOU CHECKED OFF ANY PROBLEMS, HOW DIFFICULT HAVE THESE PROBLEMS MADE IT FOR YOU TO DO YOUR WORK, TAKE CARE OF THINGS AT HOME, OR GET ALONG WITH OTHER PEOPLE: VERY DIFFICULT
SUM OF ALL RESPONSES TO PHQ QUESTIONS 1-9: 0
SUM OF ALL RESPONSES TO PHQ QUESTIONS 1-9: 17
1. LITTLE INTEREST OR PLEASURE IN DOING THINGS: NOT AT ALL
SUM OF ALL RESPONSES TO PHQ QUESTIONS 1-9: 17
SUM OF ALL RESPONSES TO PHQ QUESTIONS 1-9: 16
SUM OF ALL RESPONSES TO PHQ9 QUESTIONS 1 & 2: 0
SUM OF ALL RESPONSES TO PHQ9 QUESTIONS 1 & 2: 3

## 2025-01-21 ASSESSMENT — COLUMBIA-SUICIDE SEVERITY RATING SCALE - C-SSRS
7. DID THIS OCCUR IN THE LAST THREE MONTHS: NO
4. HAVE YOU HAD THESE THOUGHTS AND HAD SOME INTENTION OF ACTING ON THEM?: NO
2. HAVE YOU ACTUALLY HAD ANY THOUGHTS OF KILLING YOURSELF?: YES
6. HAVE YOU EVER DONE ANYTHING, STARTED TO DO ANYTHING, OR PREPARED TO DO ANYTHING TO END YOUR LIFE?: YES
5. HAVE YOU STARTED TO WORK OUT OR WORKED OUT THE DETAILS OF HOW TO KILL YOURSELF? DO YOU INTEND TO CARRY OUT THIS PLAN?: YES
1. WITHIN THE PAST MONTH, HAVE YOU WISHED YOU WERE DEAD OR WISHED YOU COULD GO TO SLEEP AND NOT WAKE UP?: YES
3. HAVE YOU BEEN THINKING ABOUT HOW YOU MIGHT KILL YOURSELF?: YES

## 2025-01-21 ASSESSMENT — ENCOUNTER SYMPTOMS
NAUSEA: 0
DIARRHEA: 0
SHORTNESS OF BREATH: 0
VOMITING: 0
ABDOMINAL PAIN: 0
COUGH: 0
CONSTIPATION: 0
WHEEZING: 0
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
## 2025-01-21 NOTE — PATIENT INSTRUCTIONS
[employer] - Financial Resources*  (Call [employer]/211 if need more resources.)       Utility:  [employer]  What they offer: Limited assistance to restore/ prevent utility disconnection.  Phone Number: [phone]  Address: [facility] [place] [address]  Website: [employer]  [facility] Action Program  Utility assistance  [phone]  [facility] Community Action Partnership  Utility assistance   [phone]  Community Action Agency of [facility]  Utility assistance  [phone]  Financial or Medical  HELP NETWORK OF [facility]:  What they do: Provides 24-hr, 7 days a week access to information on community resources for financial help. [facility] AND [facility]  Phone: 211 or [phone]            [facility] JOB AND FAMILY SERVICES:  MAIN [facility] LINE FOR ALL [facility]: [phone]  What they do: [place] works first with temporary cash assistance if there are children in household.   [facility] DJFS: [address] [place] [address]  Phone: [phone], [phone]  [facility] DJFS: [address] [place] [address]  Phone: [phone]  [facility] DJFS: [address] [place] [address]  Phone: [phone]  Website: [place].[facility]    [employer] Financial Assistance  What they offer: Assistance with [employer] bills  Phone: [phone], option #5   Medications:  [employer]  What they offer: [employer] tracks prescription drug prices and provides free drug coupons for discounts on medications.  Website: https://www.[employer]  [employer]  What they offer: [employer] offers free information on medications and healthcare cost savings programs including prescription assistance programs, coupons, and discount programs.  [employer]: 172.513.5774  Website: https://www.needUUSEEeds.org    [employer]  What they offer: Information about free and low-cost medicine programs.  Website:

## 2025-01-27 DIAGNOSIS — G43.009 MIGRAINE WITHOUT AURA, NOT INTRACTABLE, WITHOUT STATUS MIGRAINOSUS: ICD-10-CM

## 2025-01-30 RX ORDER — ONDANSETRON 4 MG/1
4 TABLET, FILM COATED ORAL DAILY PRN
Qty: 30 TABLET | Refills: 0 | Status: SHIPPED | OUTPATIENT
Start: 2025-01-30

## 2025-01-30 RX ORDER — LANSOPRAZOLE 30 MG/1
CAPSULE, DELAYED RELEASE ORAL
Qty: 180 CAPSULE | Refills: 1 | Status: SHIPPED | OUTPATIENT
Start: 2025-01-30

## 2025-02-07 ENCOUNTER — HOSPITAL ENCOUNTER (OUTPATIENT)
Dept: PSYCHIATRY | Age: 20
Setting detail: THERAPIES SERIES
Discharge: HOME | End: 2025-02-07
Payer: COMMERCIAL

## 2025-02-07 PROCEDURE — 90791 PSYCH DIAGNOSTIC EVALUATION: CPT

## 2025-02-07 ASSESSMENT — ANXIETY QUESTIONNAIRES
2. NOT BEING ABLE TO STOP OR CONTROL WORRYING: NEARLY EVERY DAY
4. TROUBLE RELAXING: SEVERAL DAYS
3. WORRYING TOO MUCH ABOUT DIFFERENT THINGS: NEARLY EVERY DAY
5. BEING SO RESTLESS THAT IT IS HARD TO SIT STILL: MORE THAN HALF THE DAYS
1. FEELING NERVOUS, ANXIOUS, OR ON EDGE: NEARLY EVERY DAY
6. BECOMING EASILY ANNOYED OR IRRITABLE: NOT AT ALL
IF YOU CHECKED OFF ANY PROBLEMS ON THIS QUESTIONNAIRE, HOW DIFFICULT HAVE THESE PROBLEMS MADE IT FOR YOU TO DO YOUR WORK, TAKE CARE OF THINGS AT HOME, OR GET ALONG WITH OTHER PEOPLE: VERY DIFFICULT
7. FEELING AFRAID AS IF SOMETHING AWFUL MIGHT HAPPEN: NEARLY EVERY DAY
GAD7 TOTAL SCORE: 15

## 2025-02-07 ASSESSMENT — SLEEP AND FATIGUE QUESTIONNAIRES
SLEEP PATTERN: DIFFICULTY FALLING ASLEEP;DIFFICULTY ARISING;DISTURBED/INTERRUPTED SLEEP
DO YOU HAVE DIFFICULTY SLEEPING: YES
DO YOU USE A SLEEP AID: NO
AVERAGE NUMBER OF SLEEP HOURS: 6

## 2025-02-07 ASSESSMENT — PATIENT HEALTH QUESTIONNAIRE - PHQ9
SUM OF ALL RESPONSES TO PHQ9 QUESTIONS 1 & 2: 4
SUM OF ALL RESPONSES TO PHQ QUESTIONS 1-9: 16
10. IF YOU CHECKED OFF ANY PROBLEMS, HOW DIFFICULT HAVE THESE PROBLEMS MADE IT FOR YOU TO DO YOUR WORK, TAKE CARE OF THINGS AT HOME, OR GET ALONG WITH OTHER PEOPLE: VERY DIFFICULT
3. TROUBLE FALLING OR STAYING ASLEEP: NEARLY EVERY DAY
6. FEELING BAD ABOUT YOURSELF - OR THAT YOU ARE A FAILURE OR HAVE LET YOURSELF OR YOUR FAMILY DOWN: NOT AT ALL
SUM OF ALL RESPONSES TO PHQ QUESTIONS 1-9: 16
4. FEELING TIRED OR HAVING LITTLE ENERGY: MORE THAN HALF THE DAYS
5. POOR APPETITE OR OVEREATING: NEARLY EVERY DAY
7. TROUBLE CONCENTRATING ON THINGS, SUCH AS READING THE NEWSPAPER OR WATCHING TELEVISION: MORE THAN HALF THE DAYS
SUM OF ALL RESPONSES TO PHQ QUESTIONS 1-9: 16
1. LITTLE INTEREST OR PLEASURE IN DOING THINGS: MORE THAN HALF THE DAYS
SUM OF ALL RESPONSES TO PHQ QUESTIONS 1-9: 15
8. MOVING OR SPEAKING SO SLOWLY THAT OTHER PEOPLE COULD HAVE NOTICED. OR THE OPPOSITE, BEING SO FIGETY OR RESTLESS THAT YOU HAVE BEEN MOVING AROUND A LOT MORE THAN USUAL: SEVERAL DAYS
2. FEELING DOWN, DEPRESSED OR HOPELESS: MORE THAN HALF THE DAYS
9. THOUGHTS THAT YOU WOULD BE BETTER OFF DEAD, OR OF HURTING YOURSELF: SEVERAL DAYS

## 2025-02-07 ASSESSMENT — LIFESTYLE VARIABLES
HOW MANY STANDARD DRINKS CONTAINING ALCOHOL DO YOU HAVE ON A TYPICAL DAY: 1 OR 2
HOW OFTEN DO YOU HAVE A DRINK CONTAINING ALCOHOL: MONTHLY OR LESS

## 2025-02-07 NOTE — CARE COORDINATION
Biopsychosocial Assessment Note    Name: Antonino Sun  Date: 2/7/2025  Start Time:   10:30 am  End Time:    11:30 am    Social work met with patient to complete the biopsychosocial assessment and CSSR-S.     Mental Status Exam:     Mental status exam revealed a 18 yo , single male who was well spoken and looked stated age. He was cooperative and displayed sad and anxious mood. He was alert and oriented to person, time, place, and situation. Thoughts appeared organized and future oriented.  Recent and remote memory appear in tact.    Presenting Problem:     Pt was referred to IOP by his counselor Paula Rowell at [facility] for suicidal ideations.      Patient Report and Notes: ***     Gender  [x] Male [] Female [] Transgender  [] Other    Sexual Orientation    [x] Heterosexual [] Homosexual [] Bisexual [] Other    Suicidal Ideation  [x] Reports   [] Denies    Homicidal Ideation  [x] Reports   [] Denies      Hallucinations/Delusions   [x] Reports   [] Denies     Substance Use/Alcohol Use/Addiction  [] Reports    [x] Denies     Trauma History  [x] Reports    [] Denies     Plan of Care:     [facility] guidelines were reviewed.  Pt to begin on Monday 2/10/25.      Patient Goal:     \"To understand food related trauma and the mental obstacles why I am not eating\"      NADEGE 7:  15  OQ: 63    Patient PHQ 9 Score:  16  Interpretation of Total Score Depression Severity: 1-4 = Minimal depression, 5-9 = Mild depression, 10-14 = Moderate depression, 15-19 = Moderately severe depression, 20-27 = Severe depression    Preliminary Diagnosis and Criteria:     OCD  NADEGE  Depressive Disorder      Signed: BRIAN Nair, LSW               2/7/2025

## 2025-02-10 ENCOUNTER — HOSPITAL ENCOUNTER (OUTPATIENT)
Dept: PSYCHIATRY | Age: 20
Setting detail: THERAPIES SERIES
Discharge: HOME | End: 2025-02-10
Payer: COMMERCIAL

## 2025-02-10 PROCEDURE — H2020 THER BEHAV SVC, PER DIEM: HCPCS

## 2025-02-10 PROCEDURE — 90792 PSYCH DIAG EVAL W/MED SRVCS: CPT | Performed by: PSYCHIATRY & NEUROLOGY

## 2025-02-10 RX ORDER — MIRTAZAPINE 7.5 MG/1
7.5 TABLET, FILM COATED ORAL NIGHTLY
Qty: 30 TABLET | Refills: 0 | Status: SHIPPED | OUTPATIENT
Start: 2025-02-10
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
## 2025-02-10 NOTE — GROUP NOTE
Group Therapy Note    Date: 2/10/2025    Group Start Time:  8:45 AM  Group End Time: 12:00 PM  Group Topic: Psychoeducation    SEYZ 7S OP BH    Group Therapy Note    Attendees: 5     Topic: How to practice self compassion.        Objective: Pt will participate in discussion of how to practice self compassion. Pt will be able to reflect on principles of: 1. Having a fair attitude toward self rather than a critical or judgmental attitude, and what having a fair attitude toward self would look like; 2. Accepting self for who you are rather than trying to be someone else and what accepting yourself would look like; 3.Taking care of self rather than denying your needs or overindulging, and what self care habits might include; 4. Accepting that struggle is normal rather than feeling uniquely bad, and what the language of accepting struggles would look like; and 5. Practicing mindful awareness rather than getting caught up in thoughts and feelings, and what the language of mindful awareness would look like.        Group Therapy Note: Pt was an active participant in the group discussion and reflected on each of the 5 principles. Patient engaged with little difficulty, was able to share own experiences, and was open to learning new information.           Check In Completed and Reviewed? Yes          Intervention needed? No     Status After Intervention:  Unchanged    Participation Level: Active Listener and Interactive    Participation Quality: Appropriate, Attentive, Sharing, and Supportive      Speech:  normal      Thought Process/Content: Logical      Affective Functioning: Congruent      Mood: anxious and depressed      Level of consciousness:  Alert, Oriented x4, and Attentive      Response to Learning: Able to verbalize current knowledge/experience, Able to verbalize/acknowledge new learning, Able to change behavior, and Progressing to
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
## 2025-02-13 ENCOUNTER — HOSPITAL ENCOUNTER (OUTPATIENT)
Dept: PSYCHIATRY | Age: 20
Setting detail: THERAPIES SERIES
Discharge: HOME | End: 2025-02-13
Payer: COMMERCIAL

## 2025-02-13 PROCEDURE — H2020 THER BEHAV SVC, PER DIEM: HCPCS

## 2025-02-14 NOTE — GROUP NOTE
Group Therapy Note    Date: 2/13/2025    Group Start Time:  8:45 AM  Group End Time: 10:30 AM  Group Topic: Psychotherapy    SEYZ 7S OP Melonie Mora LISW-S    Group Therapy Note    Attendees: 10     Patient's Goal: To complete daily check in; To increase socialization, improve communication of feelings, and learn healthy coping skills.             Mode of intervention: Education, support, socialization, exploration, self-understanding, altruism, instillation of hope, and universality.             Notes: Pt was an active participant in group focused on expression of feelings related to stressors, and how they impact mental health symptoms. Group centered on current symptom presentation, interpersonal relationships, communication, coping skills, and need for self-compassion. The concept of growth mindset was discussed and group members reflected on how they could incorporate those concepts into coping with their current stressors. Pt shared in check in that he doesn't know what to do when he feels out of control and discussed how anxious and stressed out he gets when his baby niece comes over to the house. He made positive connections with others through sharing personal information and providing support to other group members..            Pt rated mood on scale from 1 - 10       Depression: 5   Anger:  2  Anxiety: 3     Check In Completed and Reviewed? Yes             Intervention needed? No     Status After Intervention:  Unchanged    Participation Level: Active Listener and Interactive    Participation Quality: Appropriate, Attentive, Sharing, and Supportive      Speech:  normal      Thought Process/Content: Logical      Affective Functioning: Congruent      Mood: anxious and depressed      Level of consciousness:  Alert, Oriented x4, and Attentive      Response to Learning: Able to verbalize current knowledge/experience, Able to 
                                                                      Group Therapy Note    Date: 2/13/2025    Group Start Time: 1045 AM  Group End Time: 12:00 PM  Group Topic: Psychoeducation    YZ 7S OP Melonie Mora LISW-S    Group Therapy Note    Attendees: 9     Topic: Ex. 16: What’s beneath our anxiety in Anxiety Workbook; Discussion of feelings, and Iceberg Art Exercise to depict feelings we show others and feelings we keep hidden from others.        Objective: Pt will participate in discussion of messages about feelings we were taught as a child, importance of experiencing a variety of emotions, and how they help us cope. Pt will be able to identify a time they felt anxious about something in the last month, identify feelings that were on surface of the iceberg, and identify what other feelings were below the surface of their anxiety. Pt will then depict these feelings on the iceberg using colored pens, crayons, or markers.       Group Therapy Note: Pt was an active participant in the group discussion and art activity. Patient was able to identify emotions he shows to others and depict in art activity feelings below the surface.  Pt identified some improvement in mood following discussion and art activity. He engaged without difficulty, was able to share own experiences, and was open to learning new information.             Check In Completed and Reviewed? Yes            Intervention needed? No     Status After Intervention:  Unchanged    Participation Level: Active Listener and Interactive    Participation Quality: Appropriate, Attentive, Sharing, and Supportive      Speech:  normal      Thought Process/Content: Logical      Affective Functioning: Congruent      Mood: anxious and depressed      Level of consciousness:  Alert, Oriented x4, and Attentive      Response to Learning: Able to verbalize current knowledge/experience, Able to verbalize/acknowledge new learning, Able to retain information, 
DISPLAY PLAN FREE TEXT
DISPLAY PLAN FREE TEXT

## 2025-02-17 ENCOUNTER — HOSPITAL ENCOUNTER (OUTPATIENT)
Dept: PSYCHIATRY | Age: 20
Setting detail: THERAPIES SERIES
Discharge: HOME | End: 2025-02-17
Payer: COMMERCIAL

## 2025-02-17 PROCEDURE — H2020 THER BEHAV SVC, PER DIEM: HCPCS

## 2025-02-17 PROCEDURE — 99214 OFFICE O/P EST MOD 30 MIN: CPT | Performed by: PSYCHIATRY & NEUROLOGY

## 2025-02-17 NOTE — GROUP NOTE
Group Therapy Note    Date: 2/17/2025    Group Start Time: 1045 am  Group End Time: 12:00 PM  Group Topic: Psychoeducation    SEYZ 7S OP Melonie Mora LISW-S    Group Therapy Note    Attendees: 8     Topic: Cognitive Therapy techniques to change your thoughts, and Ex, 12 Facing our depression by Replacing negative thoughts with positive ones.             Objective: Pt will participate in discussion of unhelpful thinking styles and how to challenge negative thoughts. Pt will be able to provide personal examples of unhelpful thoughts and replace them with positive thoughts.         Group Therapy Note: Pt was an active participant in the group discussion. Pt was able to provide examples of negative thoughts and was able to replace them with more positive thoughts. He engaged without difficulty, was able to share own experiences, and was open to learning new information.               Check In Completed and Reviewed? Yes              Intervention needed? No     Status After Intervention:  Unchanged    Participation Level: Active Listener and Interactive    Participation Quality: Appropriate, Attentive, Sharing, and Supportive      Speech:  normal      Thought Process/Content: Logical      Affective Functioning: Congruent      Mood: anxious and depressed      Level of consciousness:  Alert, Oriented x4, and Attentive      Response to Learning: Able to verbalize current knowledge/experience, Able to verbalize/acknowledge new learning, Able to retain information, Capable of insight, Able to change behavior, and Progressing to goal      Endings: None Reported    Modes of Intervention: Education, Support, Socialization, Exploration, and Problem-solving      Discipline Responsible: /Counselor      Signature:  DENIS Vega

## 2025-02-17 NOTE — GROUP NOTE
Group Therapy Note    Date: 2025    Group Start Time:  8:45 AM  Group End Time: 12:00 PM  Group Topic: Psychoeducation    SEYZ 7S OP Melonie Mora LISW-S        Group Therapy Note    Attendees: 8         Patient's Goal:  ***    Notes:  ***    Status After Intervention:  {Status After Intervention:918892852}    Participation Level: {Participation Level:346184520}    Participation Quality: {[facility] PARTICIPATION QUALITY:169537503}      Speech:  {[facility] CD_SPEECH:60579}      Thought Process/Content: {Thought Process/Content:904949244}      Affective Functioning: {Affective Functionin}      Mood: {Mood:691390192}      Level of consciousness:  {Level of consciousness:109519818}      Response to Learning: {[facility] Responses to Learnin}      Endings: {[facility] Endings:18848}    Modes of Intervention: {[facility] Modes of Intervention:723781319}      Discipline Responsible: {[facility] Multidisciplinary:219032994}      Signature:  DENIS Vega
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
## 2025-02-17 NOTE — PROGRESS NOTES
PSYCHIATRY ATTENDING NOTE    CC: \"I'm OK.\"    Remeron helping with sleep and appetite  Groups have been going well  Mood has been \"good\"  Denies recent SI  NO medication SE    S: Patient being seen at [facility] in follow-up for depression and OCD. Remeron was added last appointment. Met with patient to discuss progress with treatment.     Los presents in fair spirits  Reports Remeron helping with sleep and appetite; agreeable to 15 mg  Groups have been going well  Mood \"good\" and denies recent SI  Reviewed obsessions   No medication side effects    MSE: White male dressed in gothic clothing and wearing dark make-up. Pleasant, cooperative, forthcoming. Normal psychomotor activity, gait, strength, tone, eye contact. Mood improving. Affect flexible. Speech clear. Thought process organized. Content future-oriented. No evidence of suicidal or homicidal ideations or behaviors. No paranoia, delusions or hallucinations. Obsessions and compulsions. Orientation, concentration, recent and remote memory are grossly intact. Fund of knowledge fair. Language use fair. Insight and judgment fair.     MEDICATIONS:   Luvox 100 mg daily (cont)  Remeron 15 mg at bed (increasing)  Strattera 40 mg daily (cont)  Buspar 10 mg bid (cont)  Hydroxyzine 10 mg tid prn (cont)    ASSESSMENT:   MDD recurrent moderate  OCD  ADHD by history  Borderline Traits    PLAN: Continue IOP and current medications. Optimize Remeron. Check YBOCS. Continue to monitor symptoms, side effects and response to medication. Adjust treatment as needed. See back two weeks. Discuss with team.                           Electronically signed by Luis Eduardo Lopez MD on 2/17/2025 at 1:03 PM

## 2025-02-27 ENCOUNTER — HOSPITAL ENCOUNTER (OUTPATIENT)
Dept: PSYCHIATRY | Age: 20
Setting detail: THERAPIES SERIES
Discharge: HOME | End: 2025-02-27
Payer: COMMERCIAL

## 2025-02-27 PROCEDURE — H2020 THER BEHAV SVC, PER DIEM: HCPCS

## 2025-02-27 NOTE — GROUP NOTE
Group Therapy Note    Date: 2/27/2025    Group Start Time:  8:45 AM  Group End Time: 12:00 PM  Group Topic: Psychoeducation    SEYZ 7S OP Melonie Mora LISW-S    Group Therapy Note    Attendees: 5     Topic: Thought Defusion: Cognitive distancing techniques.      Goal: Pt will participate in discussion and practice various cognitive distancing techniques in group.        Group Therapy Note: Pt was an active participant in group discussion and was able and willing to practice cognitive defusion techniques. This is a difficult subject to master and we will continue talking about defusion and practicing defusion techniques. Pt engaged without difficulty, was able to share own experiences, and was open to learning new information.        Check In Completed and Reviewed? Yes       Intervention needed? No     Status After Intervention:  Unchanged    Participation Level: Active Listener and Interactive    Participation Quality: Appropriate, Attentive, Sharing, and Supportive      Speech:  normal      Thought Process/Content: Logical      Affective Functioning: Congruent      Mood: anxious and depressed      Level of consciousness:  Alert, Oriented x4, and Attentive      Response to Learning: Able to verbalize current knowledge/experience, Able to verbalize/acknowledge new learning, Able to retain information, Able to change behavior, and Progressing to goal      Endings: None Reported    Modes of Intervention: Education, Support, Socialization, Exploration, and Problem-solving      Discipline Responsible: /Counselor      Signature:  DENIS Vega    
Congruent      Mood: anxious and depressed      Level of consciousness:  Alert, Oriented x4, and Attentive      Response to Learning: Able to verbalize current knowledge/experience, Able to verbalize/acknowledge new learning, Able to retain information, Able to change behavior, and Progressing to goal      Endings: None Reported    Modes of Intervention: Education, Support, Socialization, Exploration, and Problem-solving      Discipline Responsible: /Counselor      Signature:  DENIS Vega

## 2025-03-03 ENCOUNTER — HOSPITAL ENCOUNTER (OUTPATIENT)
Dept: PSYCHIATRY | Age: 20
Setting detail: THERAPIES SERIES
Discharge: HOME | End: 2025-03-03
Payer: COMMERCIAL

## 2025-03-03 PROCEDURE — H2012 BEHAV HLTH DAY TREAT, PER HR: HCPCS

## 2025-03-03 NOTE — GROUP NOTE
Attentive      Response to Learning: Able to verbalize current knowledge/experience, Able to verbalize/acknowledge new learning, Able to retain information, Capable of insight, Able to change behavior, and Progressing to goal      Endings: None Reported    Modes of Intervention: Education, Support, Socialization, Exploration, and Problem-solving      Discipline Responsible: /Counselor      Signature:  DENIS Vega

## 2025-03-03 NOTE — FLOWSHEET NOTE
30 ml of contrast were injected throughout the case. 30 mL of contrast was the total wasted during the case. 60 mL was the total amount used during the case. Pt could only stay for first group as he had dr mosher. He will only be charged for first group as a result.   No

## 2025-03-03 NOTE — PLAN OF CARE
Pt will be working on these goals:    Problem: Coping  Goal: Coping skills are improving  Description: 1.Pt will complete psychiatric evaluation and follow medication recommendations.   2. Pt will share depression, anxiety, fearful thoughts,and anger triggers and how he is managing them by utilizing coping skills learned as evidenced by verbalization in group and self-report.   3. Pt will develop 2 strategies to help cope with stressful reminders/memories of traumatic events.  4. Pt will develop strategies for thought distraction when fixating on past trauma experiences.  5. Recognize emotional triggers to poor nutrition and low body weight and develop alternative ways for meeting emotional needs.  6. Pt will be able to verbalize feelings calmly and assertively with parents    Outcome: Progressing     Problem: Depression/Self Harm  Goal: LTG-Able to verbalize and/or display a decrease in depressive symptoms  Description: 1.Pt will complete psychiatric evaluation and follow medication recommendations.   2. Pt will indicate absence of suicidal ideations for 2 weeks as reported in daily check-in.   3.Pt will share depression and anxiety triggers and how he is managing them by utilizing coping skills learned as evidenced by verbalization in group and self-report.   4.Pt will increase activities that reinforce a positive self-identity as evidenced by self report.   5 Pt will challenge negative thought patterns at least once daily and replace with adaptive thought patterns per staff observations.   6. Pt will complete relapse prevention plan and refer to this as needed, per staff observations      Outcome: Progressing

## 2025-03-04 ENCOUNTER — OFFICE VISIT (OUTPATIENT)
Dept: FAMILY MEDICINE CLINIC | Age: 20
End: 2025-03-04
Payer: COMMERCIAL

## 2025-03-04 VITALS
TEMPERATURE: 98.4 F | DIASTOLIC BLOOD PRESSURE: 74 MMHG | BODY MASS INDEX: 19.5 KG/M2 | RESPIRATION RATE: 18 BRPM | WEIGHT: 114.2 LBS | HEART RATE: 107 BPM | SYSTOLIC BLOOD PRESSURE: 114 MMHG | HEIGHT: 64 IN | OXYGEN SATURATION: 97 %

## 2025-03-04 DIAGNOSIS — E55.9 VITAMIN D DEFICIENCY: ICD-10-CM

## 2025-03-04 DIAGNOSIS — M25.50 HYPERMOBILITY ARTHRALGIA: Primary | ICD-10-CM

## 2025-03-04 DIAGNOSIS — M13.0 POLYARTHRITIS: ICD-10-CM

## 2025-03-04 DIAGNOSIS — F50.82 RESTRICTIVE FOOD INTAKE DISORDER: ICD-10-CM

## 2025-03-04 DIAGNOSIS — F39 MOOD DISORDER: ICD-10-CM

## 2025-03-04 PROCEDURE — G8420 CALC BMI NORM PARAMETERS: HCPCS | Performed by: FAMILY MEDICINE

## 2025-03-04 PROCEDURE — G8427 DOCREV CUR MEDS BY ELIG CLIN: HCPCS | Performed by: FAMILY MEDICINE

## 2025-03-04 PROCEDURE — G2211 COMPLEX E/M VISIT ADD ON: HCPCS | Performed by: FAMILY MEDICINE

## 2025-03-04 PROCEDURE — 99214 OFFICE O/P EST MOD 30 MIN: CPT | Performed by: FAMILY MEDICINE

## 2025-03-04 PROCEDURE — 1036F TOBACCO NON-USER: CPT | Performed by: FAMILY MEDICINE

## 2025-03-04 NOTE — PROGRESS NOTES
[facility]  Family Medicine Outpatient    Patient Care Team:  Samira Sena MD as PCP - General (Family Medicine)  Samira Sena MD as PCP - Empaneled Provider      SUBJECTIVE:  CC: had concerns including Follow-up (Pt here for a 6 week check. Pt states he is feeling a lot better.).  HPI:  Antonino Sun is a male 19 y.o.   History of Present Illness  Gained 11 lb in the last 6 weeks. Remeron helps appetite. Started group therapy 2/10.    He reports an improvement in his condition since the last visit, attributing this to the positive impact of group therapy sessions that he attends 3 times weekly. He has been participating in these sessions for several weeks and plans to continue for a total duration of 5 to 8 weeks, contingent on insurance coverage.     He experiences brief episodes of disorientation during periods of high stress or anxiety, describing a sensation akin to his brain shaking within his skull. These episodes are transient, lasting only a split second. He intends to discuss this symptom with his psychiatrist. His consumption of energy drinks is minimal, limited to once every few months, and he consumes soda only a few times each month.    He has experienced a slight weight gain, now exceeding 100 pounds, a milestone he had not reached in several months. This weight gain is attributed to an increased appetite following the initiation of mirtazapine therapy by his psychiatrist. He reports no difficulty in eating, with the exception of a single episode of nausea triggered by food thoughts a few days prior, which was resolved by consuming an alternative food item.    He has been referred to a rheumatologist but is seeking a referral to a physical therapist due to persistent knee and back pain, as well as significant hamstring discomfort. He suspects these symptoms may be due to improper standing posture at work, particularly locking his knees, which has not resulted in fainting but is a

## 2025-03-06 ENCOUNTER — HOSPITAL ENCOUNTER (OUTPATIENT)
Dept: PSYCHIATRY | Age: 20
Setting detail: THERAPIES SERIES
Discharge: HOME | End: 2025-03-06
Payer: COMMERCIAL

## 2025-03-06 PROCEDURE — 99214 OFFICE O/P EST MOD 30 MIN: CPT | Performed by: PSYCHIATRY & NEUROLOGY

## 2025-03-06 RX ORDER — ATOMOXETINE 60 MG/1
60 CAPSULE ORAL DAILY
Qty: 30 CAPSULE | Refills: 0 | Status: SHIPPED | OUTPATIENT
Start: 2025-03-06

## 2025-03-06 RX ORDER — ATOMOXETINE 60 MG/1
60 CAPSULE ORAL DAILY
Qty: 30 CAPSULE | Refills: 0 | Status: SHIPPED | OUTPATIENT
Start: 2025-03-06 | End: 2025-03-06

## 2025-03-06 RX ORDER — MIRTAZAPINE 15 MG/1
15 TABLET, FILM COATED ORAL NIGHTLY
Qty: 30 TABLET | Refills: 0 | Status: SHIPPED | OUTPATIENT
Start: 2025-03-06 | End: 2025-03-06

## 2025-03-06 RX ORDER — MIRTAZAPINE 15 MG/1
15 TABLET, FILM COATED ORAL NIGHTLY
Qty: 30 TABLET | Refills: 0 | Status: SHIPPED | OUTPATIENT
Start: 2025-03-06

## 2025-03-06 RX ORDER — MIRTAZAPINE 7.5 MG/1
7.5 TABLET, FILM COATED ORAL NIGHTLY
Qty: 90 TABLET | Refills: 1 | OUTPATIENT
Start: 2025-03-06

## 2025-03-06 NOTE — TELEPHONE ENCOUNTER
Name of Medication(s) Requested:  Requested Prescriptions     Pending Prescriptions Disp Refills    mirtazapine (REMERON) 7.5 MG tablet [Pharmacy Med Name: MIRTAZAPINE 7.5 MG TABLET] 90 tablet 1     Sig: TAKE 1 TABLET BY MOUTH EVERY DAY AT NIGHT       Medication is on current medication list Yes    Dosage and directions were verified? Yes    Quantity verified: 30 day supply     Pharmacy Verified?  Yes    Last Appointment:  Visit date not found    Future appts:  Future Appointments   Date Time Provider Department Center   [facility]  [facility]   3/10/2025  7:40 AM Twila Pittman APRN - CNP [facility]   5/12/2025 11:00 AM Samira Sena MD W[facility] DEP        (If no appt send self scheduling link. .REFILLAPPT)  Scheduling request sent?     [] Yes  [] No    Does patient need updated?  [] Yes  [] No

## 2025-03-06 NOTE — PROGRESS NOTES
PSYCHIATRY ATTENDING NOTE    CC: \"Pretty good overall.\"     S: Patient being seen at [facility] in follow-up for depression and OCD. Remeron was increased last appointment. Met with patient to discuss progress with treatment.     Los presents in good spirits  Doing well on Remeron and happy with weight increase  Reports 107 pounds and would like to get to 115-120   No major mood concerns and sleeping well  Notes residual inattention and would like to try increase on Strattera  OCD symptoms have been stable; has yet to complete YBOCS  No medication side effects  Connecting well with other patients in groups  No recent suicidal thoughts    MSE: White male appears age. Pleasant, cooperative, forthcoming. Normal psychomotor activity, gait, strength, tone, eye contact. Mood euthymic. Affect flexible. Speech clear. Thought process organized. Content future-oriented. No evidence of suicidal or homicidal ideations or behaviors. No paranoia, delusions or hallucinations.Orientation, concentration, recent and remote memory are grossly intact. Fund of knowledge fair. Language use fair. Insight and judgment fair.     MEDICATIONS:   Luvox 100 mg daily (cont)  Remeron 15 mg at bed (cont)  Strattera 60 mg daily (increasing)  Buspar 10 mg bid (cont)  Hydroxyzine 10 mg tid prn (cont)    ASSESSMENT:   MDD recurrent moderate  OCD  ADHD   Borderline Traits    PLAN: Continue IOP and current medications. Optimize Strattera. Patient responding well to treatment interventions. Continue to monitor symptoms, side effects and response to medication. Adjust treatment as needed. See back two weeks. Discuss with team.                           Electronically signed by Luis Eduardo Lopez MD on 3/6/2025 at 2:54 PM

## 2025-03-07 NOTE — PLAN OF CARE
Treatment planning meeting was held on Tues., 3/04/25 at which time pt's progress was discussed. Team members present included: Dr John MD, DENIS Vega, KARY Harris, Faith Knowles, W, and Edith Bloom, BHI Coordinator.         Pt is working on the following goals set by sw:     Problem: Coping  Goal: Coping skills are improving  Description: 1.Pt will complete psychiatric evaluation and follow medication recommendations.   2. Pt will share depression, anxiety, fearful thoughts,and anger triggers and how he is managing them by utilizing coping skills learned as evidenced by verbalization in group and self-report.   3. Pt will develop 2 strategies to help cope with stressful reminders/memories of traumatic events.  4. Pt will develop strategies for thought distraction when fixating on past trauma experiences.  5. Recognize emotional triggers to poor nutrition and low body weight and develop alternative ways for meeting emotional needs.  6. Pt will be able to verbalize feelings calmly and assertively with parents    Outcome: Progressing     Problem: Depression/Self Harm  Goal: LTG-Able to verbalize and/or display a decrease in depressive symptoms  Description: 1.Pt will complete psychiatric evaluation and follow medication recommendations.   2. Pt will indicate absence of suicidal ideations for 2 weeks as reported in daily check-in.   3.Pt will share depression and anxiety triggers and how he is managing them by utilizing coping skills learned as evidenced by verbalization in group and self-report.   4.Pt will increase activities that reinforce a positive self-identity as evidenced by self report.   5 Pt will challenge negative thought patterns at least once daily and replace with adaptive thought patterns per staff observations.   6. Pt will complete relapse prevention plan and refer to this as needed, per staff observations    Outcome: Progressing    Progress:  Pt has made some progress on

## 2025-03-10 ENCOUNTER — OFFICE VISIT (OUTPATIENT)
Age: 20
End: 2025-03-10
Payer: COMMERCIAL

## 2025-03-10 ENCOUNTER — HOSPITAL ENCOUNTER (OUTPATIENT)
Dept: PSYCHIATRY | Age: 20
Setting detail: THERAPIES SERIES
Discharge: HOME | End: 2025-03-10
Payer: COMMERCIAL

## 2025-03-10 VITALS
BODY MASS INDEX: 19.01 KG/M2 | OXYGEN SATURATION: 99 % | HEIGHT: 65 IN | SYSTOLIC BLOOD PRESSURE: 110 MMHG | TEMPERATURE: 97.9 F | RESPIRATION RATE: 16 BRPM | HEART RATE: 120 BPM | DIASTOLIC BLOOD PRESSURE: 70 MMHG | WEIGHT: 114.1 LBS

## 2025-03-10 DIAGNOSIS — K21.9 GASTRO-ESOPHAGEAL REFLUX DISEASE WITHOUT ESOPHAGITIS: Primary | ICD-10-CM

## 2025-03-10 DIAGNOSIS — K59.00 CONSTIPATION, UNSPECIFIED CONSTIPATION TYPE: ICD-10-CM

## 2025-03-10 DIAGNOSIS — R63.4 WEIGHT LOSS: ICD-10-CM

## 2025-03-10 PROCEDURE — G8420 CALC BMI NORM PARAMETERS: HCPCS | Performed by: NURSE PRACTITIONER

## 2025-03-10 PROCEDURE — G8427 DOCREV CUR MEDS BY ELIG CLIN: HCPCS | Performed by: NURSE PRACTITIONER

## 2025-03-10 PROCEDURE — 1036F TOBACCO NON-USER: CPT | Performed by: NURSE PRACTITIONER

## 2025-03-10 PROCEDURE — H2020 THER BEHAV SVC, PER DIEM: HCPCS

## 2025-03-10 PROCEDURE — 99212 OFFICE O/P EST SF 10 MIN: CPT | Performed by: NURSE PRACTITIONER

## 2025-03-10 RX ORDER — SIMETHICONE 80 MG
80 TABLET,CHEWABLE ORAL 4 TIMES DAILY PRN
Qty: 180 TABLET | Refills: 3 | Status: SHIPPED | OUTPATIENT
Start: 2025-03-10

## 2025-03-10 NOTE — GROUP NOTE
Group Therapy Note    Date: 3/10/2025    Group Start Time:  8:45 AM  Group End Time: 10:40 AM  Group Topic: Psychotherapy    [facility] 7S OP Melonie Mora LISW-S        Group Therapy Note    Attendees: 7         Patient's Goal:  ***    Notes:  ***    Status After Intervention:  {Status After Intervention:932335466}    Participation Level: {Participation Level:751035514}    Participation Quality: {[facility] PARTICIPATION QUALITY:471319396}      Speech:  {[facility] CD_SPEECH:19909}      Thought Process/Content: {Thought Process/Content:275062088}      Affective Functioning: {Affective Functionin}      Mood: {Mood:879840848}      Level of consciousness:  {Level of consciousness:011292356}      Response to Learning: {[facility] Responses to Learnin}      Endings: {[facility] Endings:30891}    Modes of Intervention: {[facility] Modes of Intervention:302561842}      Discipline Responsible: {[facility] Multidisciplinary:883004476}      Signature:  DENIS Vega

## 2025-03-10 NOTE — GROUP NOTE
Group Therapy Note    Date: 3/10/2025    Group Start Time:  8:45 AM  Group End Time: 12:10 PM  Group Topic: Psychoeducation    [facility] 7S OP Melonie Mora LISW-S    Group Therapy Note    Attendees: 8     Topic: Antidotes to Shame and Guilt; Living in line with our values (ex. 12: in Shame and guilt workbook.)      Goal: Pt will participate in discussion regarding importance of living in line with our values and reflect on how failure to live in line with our values can result in feelings of shame and guilt..         Group Therapy Note: Pt was an active participant in group discussion and was able to reflect on importance of living in line with our values and share times when they weren’t in line with their values and how they experienced sense of guilt and shame as a result.  Pt engaged without difficulty, was able to share own experiences, and was open to learning new information.        Check In Completed and Reviewed? Yes       Intervention needed? No     Status After Intervention:  Improved    Participation Level: Active Listener and Interactive    Participation Quality: Appropriate, Attentive, Sharing, and Supportive      Speech:  normal      Thought Process/Content: Logical      Affective Functioning: Congruent      Mood: euthymic      Level of consciousness:  Alert, Oriented x4, and Attentive      Response to Learning: Able to verbalize current knowledge/experience, Able to verbalize/acknowledge new learning, Able to retain information, Capable of insight, Able to change behavior, and Progressing to goal      Endings: None Reported    Modes of Intervention: Education, Support, Socialization, Exploration, and Problem-solving      Discipline Responsible: /Counselor      Signature:  DENIS Vega

## 2025-03-10 NOTE — GROUP NOTE
Group Therapy Note    Date: 3/10/2025    Group Start Time:  8:45 AM  Group End Time: 10:40 AM  Group Topic: Psychotherapy    [facility] 7S OP Melonie Mora LISW-S    Group Therapy Note    Attendees: 7     Patient's Goal: To complete daily check in; To increase socialization, improve communication of feelings, and learn healthy coping skills.             Mode of intervention: Education, support, socialization, exploration, self-understanding, altruism, instillation of hope, and universality.             Notes: Pt was an active participant in group focused on expression of feelings related to stressors, and how they impact mental health symptoms. Group centered on current symptom presentation, interpersonal relationships, communication, coping skills, and need for self-compassion.  Group also explored how fear holds each of us back and members shared personal examples of this. Pt shared in check in that he is doing a lot better than when he started the program. He states his weight is over 100 now and and he is eating better, is starting pt so he can start building muscle, and has more energy to do things. He states he played guitar for the first time in a long while. He discussed guilt when ever he doesn't use his time wisely and shared re: how fear and anxiety hold him back.He made positive connections with others through sharing personal information and providing support to other group members.             Check In Completed and Reviewed? Yes             Intervention needed? No       Status After Intervention:  Improved    Participation Level: Active Listener and Interactive    Participation Quality: Appropriate, Attentive, Sharing, and Supportive      Speech:  normal      Thought Process/Content: Logical      Affective Functioning: Congruent      Mood: euthymic      Level of consciousness:  Alert, Oriented x4, and Attentive      Response to

## 2025-03-10 NOTE — PROGRESS NOTES
Antonino Sun (:  2005) is a 19 y.o. male, here for evaluation of the following chief complaint(s):  Follow-up (Pt is here for a follow up for gerd and weight)      SUBJECTIVE/OBJECTIVE:  HPI:    Antonino is a very pleasant 19 year old gentleman that presents today for a weight check    Patient has a history of weight loss, constipation, and abdominal pain  He follows an EDS diet  Patient admits to having difficulty eating. Most likely a mental health component  He does follow up with psychiatry.  BMI at last office visit was 16.  He has  been as  low as 93 lbs    Patient was started on Remeron a few weeks ago and this has helped his appetite  He is feeling better  Has increased portion sizes        ROS:  General: Patient denies n/v/f/c or weight loss.  HEENT: Patient denies persistent postnasal drip, scleral icterus, drooling, persistent bleeding from nose/mouth.  Resp: Patient denies SOB, wheezing, productive cough.  Cards: Patient denies CP, palpitations, significant edema  GI: As above.  Derm: Patient denies jaundice/rashes.   Musc: Patient denies diffuse/irregular joint swelling or myalgias.      Objective   Wt Readings from Last 3 Encounters:   03/10/25 51.8 kg (114 lb 1.6 oz)   25 51.8 kg (114 lb 3.2 oz)   25 47.6 kg (105 lb)     Temp Readings from Last 3 Encounters:   03/10/25 97.9 °F (36.6 °C) (Infrared)   25 98.4 °F (36.9 °C) (Temporal)   25 97.2 °F (36.2 °C)     BP Readings from Last 3 Encounters:   03/10/25 110/70   25 114/74   25 125/85     Pulse Readings from Last 3 Encounters:   03/10/25 (!) 120   25 (!) 107   25 (!) 117        Physical Exam  Constitutional:       Appearance: Normal appearance.   Neurological:      Mental Status: He is alert.         Past Medical History:   Diagnosis Date    ADHD     Chronic depression     OCD (obsessive compulsive disorder)     Social anxiety disorder       Past Surgical History:   Procedure Laterality Date

## 2025-03-11 ENCOUNTER — HOSPITAL ENCOUNTER (OUTPATIENT)
Dept: PSYCHIATRY | Age: 20
Setting detail: THERAPIES SERIES
Discharge: HOME | End: 2025-03-11
Payer: COMMERCIAL

## 2025-03-11 PROCEDURE — H2020 THER BEHAV SVC, PER DIEM: HCPCS
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
## 2025-03-14 PROBLEM — F50.82 RESTRICTIVE FOOD INTAKE DISORDER: Status: ACTIVE | Noted: 2025-03-14

## 2025-03-14 PROBLEM — F39 MOOD DISORDER: Status: ACTIVE | Noted: 2025-03-14

## 2025-03-14 PROBLEM — M25.50 HYPERMOBILITY ARTHRALGIA: Status: ACTIVE | Noted: 2025-03-14

## 2025-03-14 PROBLEM — M13.0 POLYARTHRITIS: Status: ACTIVE | Noted: 2025-03-14

## 2025-03-14 ASSESSMENT — ENCOUNTER SYMPTOMS
SHORTNESS OF BREATH: 0
NAUSEA: 0
WHEEZING: 0
VOMITING: 0
COUGH: 0
ABDOMINAL PAIN: 0
DIARRHEA: 0
CONSTIPATION: 0

## 2025-03-18 ENCOUNTER — HOSPITAL ENCOUNTER (OUTPATIENT)
Dept: PSYCHIATRY | Age: 20
Setting detail: THERAPIES SERIES
Discharge: HOME | End: 2025-03-18
Payer: COMMERCIAL

## 2025-03-18 PROCEDURE — H2020 THER BEHAV SVC, PER DIEM: HCPCS

## 2025-03-18 PROCEDURE — 99214 OFFICE O/P EST MOD 30 MIN: CPT | Performed by: PSYCHIATRY & NEUROLOGY

## 2025-03-18 RX ORDER — ATOMOXETINE 60 MG/1
60 CAPSULE ORAL DAILY
Qty: 30 CAPSULE | Refills: 0 | Status: SHIPPED | OUTPATIENT
Start: 2025-03-18

## 2025-03-18 RX ORDER — MIRTAZAPINE 7.5 MG/1
7.5 TABLET, FILM COATED ORAL NIGHTLY
Qty: 30 TABLET | Refills: 0 | Status: SHIPPED | OUTPATIENT
Start: 2025-03-18

## 2025-03-18 RX ORDER — FLUVOXAMINE MALEATE 100 MG
100 TABLET ORAL DAILY
Qty: 30 TABLET | Refills: 0 | Status: SHIPPED | OUTPATIENT
Start: 2025-03-18

## 2025-03-18 NOTE — GROUP NOTE
Group Therapy Note    Date: 3/18/2025    Group Start Time:  8:45 AM  Group End Time: 10:30 AM  Group Topic: Psychotherapy    [facility] 7[facility] OP Melonie Mora LISW-S    Group Therapy Note    Attendees: 5     Patient's Goal: To complete daily check in; To increase socialization, improve communication of feelings, and learn healthy coping skills.             Mode of intervention: Education, support, socialization, exploration, self-understanding, altruism, instillation of hope, and universality.             Notes: Pt was an active participant in group focused on expression of feelings related to stressors, and how they impact mental health symptoms. Group centered on current symptom presentation, interpersonal relationships, communication, coping skills, and need for self-compassion.   Pt shared in check in that he had a good weekend. He reports a lot of improvement in that he was able to gain weight, went to dinner with parents and ate all his steak, He has had more energy and was able to do household chores, laundry etc. He worked 2 shifts back to back at work. He discussed feeling disappointed that parents were talking more about mom's job than asking Los anything and Los expressed feeling left out and that he felt he didn't matter. He was able to recognize though that he has really never wanted to communicate and now he feels he is much more engaged socially and wants to start having conversations with parents and spending more time together. He recognized that he needs to let parents know how he feels as this is a big improvement for him and they had no idea he wanted to talk about anything. He and sisters are planning to start playing dungeons and dragons together which he is excited about. He made positive connections with others through sharing personal information and providing support to other group members.           Check In Completed and

## 2025-03-18 NOTE — GROUP NOTE
Group Therapy Note    Date: 3/18/2025    Group Start Time: 1045 am  Group End Time: 12:00 PM  Group Topic: Psychoeducation    [facility] 7[facility] OP Melonie Mora LISW-S      Topic: Spiritual Wellness: Self-Esteem and Affirmations         Goal: Relief from isolation and loneliness, Jacqueline Sharing     Self-understanding and gain insight, Acceptance and belonging,    Recognize they are not alone, Socialization.     Empowerment, and Encouragement       Attendees: SAUNDRA Machado LISW-S Jerry Anastasia, LISW Rosalyn Tarver, Chaplain      Group Therapy Note: Pt was an active group participant in Spiritual wellness group where he shared regarding struggles and how self esteem has been affected. He was able to identify importance of positive self-esteem and chose an affirmation that reinforced positive view of self.     Status After Intervention:  Improved    Participation Level: Active Listener and Interactive    Participation Quality: Appropriate, Attentive, Sharing, and Supportive      Speech:  normal      Thought Process/Content: Logical      Affective Functioning: Congruent      Mood: euthymic      Level of consciousness:  Alert, Oriented x4, and Attentive      Response to Learning: Able to verbalize current knowledge/experience, Able to verbalize/acknowledge new learning, Able to retain information, Capable of insight, Able to change behavior, and Progressing to goal      Endings: None Reported    Modes of Intervention: Education, Support, Socialization, Exploration, and Problem-solving      Discipline Responsible: /Counselor      Signature:  DENIS Vega
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
## 2025-03-19 ENCOUNTER — HOSPITAL ENCOUNTER (OUTPATIENT)
Dept: PHYSICAL THERAPY | Age: 20
Setting detail: THERAPIES SERIES
Discharge: HOME | End: 2025-03-19
Payer: COMMERCIAL

## 2025-03-19 PROCEDURE — 97112 NEUROMUSCULAR REEDUCATION: CPT | Performed by: PHYSICAL THERAPIST

## 2025-03-19 PROCEDURE — 97161 PT EVAL LOW COMPLEX 20 MIN: CPT | Performed by: PHYSICAL THERAPIST

## 2025-03-19 PROCEDURE — 97110 THERAPEUTIC EXERCISES: CPT | Performed by: PHYSICAL THERAPIST

## 2025-03-19 NOTE — PROGRESS NOTES
[facility]                Phone: [phone]   Fax: [phone]    Physical Therapy Daily Treatment Note  Date:  3/19/2025    Patient Name:  Antonino Sun    :  2005  MRN: [id number]    Restrictions/Precautions:    Diagnosis:    Treatment Diagnosis:    Insurance/Certification information:    Referring Physician:    Plan of care signed (Y/N):    Visit# / total visits:   Pain level: /10   Time In:0900  Time Out:1000    Subjective:  pt c/o generalized weakness    Exercises:  Exercise/Equipment Resistance/Repetitions Other comments                   Exercises UE X30 scap rows high & mid  X30 sh ext high & mid  X30 triceps ext  X30 reverse flys Lime band     Exercises on mat X30 supine SLR lime band  X30 prone hip ext lime band  X30 glute bridge  X30 glute bridge with hip add ball squeeze  X30 side-ling clamshell lime band  X30 side-lying hip abd lime band                                                                                                                  Other Therapeutic Activities:      Home Exercise Program:  HEP 3/19/25    Manual Treatments:      Modalities:      Timed Code Treatment Minutes:  60    Total Treatment Minutes:  60    Treatment/Activity Tolerance:  [x] Patient tolerated treatment well [] Patient limited by fatique  [] Patient limited by pain  [] Patient limited by other medical complications  [] Other:     Prognosis: [] Good [] Fair  [] Poor    Patient Requires Follow-up: [] Yes  [] No    Plan:   [x] Continue per plan of care [] Alter current plan (see comments)  [] Plan of care initiated [] Hold pending MD visit [] Discharge  Plan for Next Session:      See Weekly Progress Note: []  Yes  []  No  Next due:        Electronically signed by:  Josh Mayberry, PT   
and that they will be provided while the patient is under my care.    Physician's Comments/Revisions:               Physician's Printed Name:                                           Physician's Signature:                                                               Date:

## 2025-03-20 ENCOUNTER — HOSPITAL ENCOUNTER (OUTPATIENT)
Dept: PSYCHIATRY | Age: 20
Setting detail: THERAPIES SERIES
Discharge: HOME | End: 2025-03-20
Payer: COMMERCIAL
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
## 2025-03-31 ENCOUNTER — HOSPITAL ENCOUNTER (OUTPATIENT)
Dept: PSYCHIATRY | Age: 20
Setting detail: THERAPIES SERIES
Discharge: HOME | End: 2025-03-31

## 2025-03-31 NOTE — CARE COORDINATION
Pt hasn't attended group since 3/18. SW called and spoke with pt. He states his stomach has been hurting too bad in the morning. He has been able to attend work as stomach feels better later in day and he is usually scheduled to work in evening. SW informed him he was missed and that we hope he can resume iop. BASILIO transferred him to [place] so he could set up dr mosher. Support offered.

## 2025-04-01 ENCOUNTER — HOSPITAL ENCOUNTER (OUTPATIENT)
Dept: PSYCHIATRY | Age: 20
Setting detail: THERAPIES SERIES
Discharge: HOME | End: 2025-04-01

## 2025-04-01 RX ORDER — MIRTAZAPINE 15 MG/1
15 TABLET, FILM COATED ORAL NIGHTLY
Qty: 30 TABLET | Refills: 0 | Status: SHIPPED | OUTPATIENT
Start: 2025-04-01

## 2025-04-01 NOTE — PROGRESS NOTES
PSYCHIATRY ATTENDING NOTE    CC: \"Up and down.\"    Antonino presents discouraged today  \"Immobilized\" from the stomach pain which has been hurting more  Started happening after his car broke down and transportation difficult  Discussed possibility this may be psychosomatic manifestations of anxiety  Hopeful to start attending group regularly again  Also fell out of PT  Weight still stable at 113 pounds  Reviewed medications - mutually agreed to increase Remeron  No safety concerns    S: Patient being seen at [facility] in follow-up for depression and OCD. Met with patient to discuss progress with treatment.     MSE: White male appears age. Pleasant, cooperative, forthcoming. Normal psychomotor activity, gait, strength, tone, eye contact. Mood euthymic. Affect flexible. Speech clear. Thought process organized. Content future-oriented. No evidence of suicidal or homicidal ideations or behaviors. No paranoia, delusions or hallucinations.Orientation, concentration, recent and remote memory are grossly intact. Fund of knowledge fair. Language use fair. Insight and judgment fair.     MEDICATIONS:   Luvox 100 mg daily (cont)  Remeron 15 mg at bed (increasing)  Strattera 60 mg daily (cont)  Buspar 10 mg bid (cont)  Hydroxyzine 10 mg tid prn (cont)    ASSESSMENT:   MDD recurrent moderate  OCD  ADHD   Borderline Traits    PLAN: Continue IOP and current medications. Optimize Remeron. Continue to monitor symptoms, side effects and response to medication. Adjust treatment as needed. See back three weeks. Discuss with team.                           Electronically signed by Luis Eduardo Lopez MD on 4/1/2025 at 1:01 PM

## 2025-04-03 ENCOUNTER — HOSPITAL ENCOUNTER (OUTPATIENT)
Dept: PSYCHIATRY | Age: 20
Setting detail: THERAPIES SERIES
Discharge: HOME | End: 2025-04-03
Payer: COMMERCIAL

## 2025-04-03 PROCEDURE — H2020 THER BEHAV SVC, PER DIEM: HCPCS

## 2025-04-03 NOTE — GROUP NOTE
Group Therapy Note    Date: 4/3/2025    Group Start Time:  8:45 AM  Group End Time: 10:50 AM  Group Topic: Psychotherapy    [facility] 7S OP eMlonie Mora LISW-S    Group Therapy Note    Attendees: 11     Patient's Goal: To complete daily check in; To increase socialization, improve communication of feelings, and learn healthy coping skills.             Mode of intervention: Education, support, socialization, exploration, self-understanding, altruism, instillation of hope, and universality.             Notes: Pt was an active participant in group focused on expression of feelings related to stressors, and how they impact mental health symptoms. Group centered on current symptom presentation, interpersonal relationships, communication, coping skills, and need for self-compassion. Pt shared in check in that he has been under more stress re: car breaking down and needing to pay for car repairs which he can't afford. He identified that his stomach pains have more to do with physical reactions to stress rather than decompensating. 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 He identified that he has had good interactions with his father now who is helping him with making better financial decisions. He made positive connections with others through sharing personal information and providing support to other group members.           Check In Completed and Reviewed? Yes             Intervention needed? No     Status After Intervention:  Unchanged    Participation Level: Active Listener and Interactive    Participation Quality: Appropriate, Attentive, Sharing, and Supportive      Speech:  normal      Thought Process/Content: Logical      Affective Functioning: Congruent      Mood: anxious and depressed      Level of consciousness:

## 2025-04-04 NOTE — PLAN OF CARE
Treatment planning meeting was held on Tues., 4/01/25 at which time pt's progress was discussed. Team members present included: Dr John MD, DENIS Vega, KARY Harris, and RACHEAL Nair.      Pt is working on the following goals set by sw:     Problem: Coping  Goal: Coping skills are improving  Description: 1.Pt will complete psychiatric evaluation and follow medication recommendations.   2. Pt will share depression, anxiety, fearful thoughts,and anger triggers and how he is managing them by utilizing coping skills learned as evidenced by verbalization in group and self-report.   3. Pt will develop 2 strategies to help cope with stressful reminders/memories of traumatic events.  4. Pt will develop strategies for thought distraction when fixating on past trauma experiences.  5. Recognize emotional triggers to poor nutrition and low body weight and develop alternative ways for meeting emotional needs.  6. Pt will be able to verbalize feelings calmly and assertively with parents    Outcome: Progressing     Problem: Depression/Self Harm  Goal: LTG-Able to verbalize and/or display a decrease in depressive symptoms  Description: 1.Pt will complete psychiatric evaluation and follow medication recommendations.   2. Pt will indicate absence of suicidal ideations for 2 weeks as reported in daily check-in.   3.Pt will share depression and anxiety triggers and how he is managing them by utilizing coping skills learned as evidenced by verbalization in group and self-report.   4.Pt will increase activities that reinforce a positive self-identity as evidenced by self report.   5 Pt will challenge negative thought patterns at least once daily and replace with adaptive thought patterns per staff observations.   6. Pt will complete relapse prevention plan and refer to this as needed, per staff observations    Outcome: Progressing      Progress:    Pt is making good progress on above goals. He has

## 2025-04-08 ENCOUNTER — TELEPHONE (OUTPATIENT)
Dept: PSYCHIATRY | Age: 20
End: 2025-04-08

## 2025-04-08 NOTE — TELEPHONE ENCOUNTER
4/8/2025    Pt has missed Monday and Tuesday this week so called him and he stated that he has not been feeling well. Further expounding on this he stated that he had gotten increasingly angry since Thursday and wonders if it was due to the increase of one of his medications.Pt missed his dr, appointment today and informed him that we will try to reschedule for Thursday. He stated that he will see his therapist tomorrow at 1 pm and stated he is ok and plans to return Thursday. Encouraged pt to make in in to IOP this week.  Electronically signed by OTTO Briceño on 4/8/2025 at 1:21 PM

## 2025-04-10 ENCOUNTER — HOSPITAL ENCOUNTER (OUTPATIENT)
Dept: PSYCHIATRY | Age: 20
Setting detail: THERAPIES SERIES
Discharge: HOME | End: 2025-04-10
Payer: COMMERCIAL

## 2025-04-10 DIAGNOSIS — F42.9 OBSESSIVE-COMPULSIVE DISORDER, UNSPECIFIED TYPE: Primary | ICD-10-CM

## 2025-04-10 DIAGNOSIS — F33.1 MAJOR DEPRESSIVE DISORDER, RECURRENT EPISODE, MODERATE (HCC): ICD-10-CM

## 2025-04-10 PROCEDURE — H2020 THER BEHAV SVC, PER DIEM: HCPCS

## 2025-04-10 PROCEDURE — 99214 OFFICE O/P EST MOD 30 MIN: CPT | Performed by: PSYCHIATRY & NEUROLOGY

## 2025-04-10 RX ORDER — ATOMOXETINE 60 MG/1
60 CAPSULE ORAL DAILY
Qty: 30 CAPSULE | Refills: 0 | Status: SHIPPED | OUTPATIENT
Start: 2025-04-10

## 2025-04-10 RX ORDER — FLUVOXAMINE MALEATE 100 MG
100 TABLET ORAL DAILY
Qty: 30 TABLET | Refills: 0 | Status: SHIPPED | OUTPATIENT
Start: 2025-04-10

## 2025-04-10 NOTE — PROGRESS NOTES
PSYCHIATRY ATTENDING NOTE    CC: \"The Remeron was not good.\"    S: Patient being seen at [facility] in follow-up for depression and OCD. Remeron increased to 15 mg last appointment. Spoke to patient via telephone to discuss progress with treatment. [facility]  Reports increasing Remeron did not go well  States it made him angry and altered visual depth perception  Took higher dose for 5 days then lowered back to 7.5 mg and feels better  Has not had recent abdominal pain and insightful it was likely stress-related  Discussed timeline for discharge from [facility]   No acute issues or concerns otherwise     MSE: Male. Pleasant, cooperative, forthcoming. Mood euthymic. Speech clear. Thought process organized. Content future-oriented. No evidence of suicidal or homicidal ideations or behaviors. No paranoia, delusions or hallucinations. Orientation, concentration, recent and remote memory are grossly intact. Fund of knowledge fair. Language use fair. Insight and judgment fair.     MEDICATIONS:   Luvox 100 mg daily (cont)  Remeron 7.5 mg at bed (cont)  Strattera 60 mg daily (cont)  Buspar 10 mg bid (cont)  Hydroxyzine 10 mg tid prn (cont)    ASSESSMENT:   MDD recurrent moderate  OCD  ADHD   Borderline Traits    PLAN: Continue IOP and current medications. Support, reassurance provided. Continue to monitor symptoms, side effects and response to medication. Adjust treatment as needed. See back 2-3 weeks prior to discharge. Discuss with team.                           Electronically signed by Luis Eduardo Lopez MD on 4/10/2025 at 1:43 PM

## 2025-04-10 NOTE — GROUP NOTE
Group Therapy Note    Date: 4/10/2025  Group Start Time: 10:45 AM  Group End Time: 12:00 PM    Number of participants :5  Type of group: Psychoeducation  Mode of intervention: Education, Support, Socialization, Exploration, Clarifying, Problem-solving, and Activity  Topic: Emotional Baggage  Objective: To learn what emotional baggage is; how to shed it and practical ways to reach your full potential  Group Therapy Note:  Pt actively participated in the group session. The education topic was: What is Emotional Baggage: how to shed it and practical ways to reach your full potential.   He was attentive and able to interact in the group discussion. The group was asked to share what practical skill they could use to deal with their emotional baggage and he shared: letting go of the past and forgiving others. He was receptive to the feedback and suggestions from the group.      Status After Intervention:  Improved    Participation Level: Active Listener and Interactive    Participation Quality: Appropriate, Attentive, and Sharing      Speech:  normal      Thought Process/Content: Logical      Affective Functioning: Congruent      Mood: anxious and depressed      Level of consciousness:  Alert, Oriented x4, and Attentive      Response to Learning: Able to verbalize current knowledge/experience, Able to verbalize/acknowledge new learning, and Able to retain information      Endings: None Reported    Modes of Intervention: Education, Support, Socialization, Exploration, Clarifying, Problem-solving, and Activity      Discipline Responsible: /Counselor  Electronically signed by OTTO Briceño on 4/10/2025 at 12:38 PM       Signature:  OTTO Briceño    
Worker/Counselor    Electronically signed by OTTO Briceño on 4/10/2025 at 12:22 PM   Signature:  OTTO Briceño

## 2025-04-15 ENCOUNTER — HOSPITAL ENCOUNTER (OUTPATIENT)
Dept: PSYCHIATRY | Age: 20
Setting detail: THERAPIES SERIES
Discharge: HOME | End: 2025-04-15
Payer: COMMERCIAL

## 2025-04-15 PROCEDURE — H2020 THER BEHAV SVC, PER DIEM: HCPCS

## 2025-04-15 PROCEDURE — 99214 OFFICE O/P EST MOD 30 MIN: CPT | Performed by: PSYCHIATRY & NEUROLOGY

## 2025-04-15 NOTE — PROGRESS NOTES
PSYCHIATRY ATTENDING NOTE    CC: \"I'm doing OK.\"    S: Patient being seen at [facility] in follow-up for depression and OCD.     Los presents in good spirits  Comfortable with current medication regimen  Continues to have breakthrough GERD despite lansoprazole  Has follow-up with GI again next month and will discuss  Discussed timeline for transition out of IOP  Will return to NP and therapist at [facility]  No acute issues or concerns     MSE: White male. Pleasant, cooperative, forthcoming. Normal psychomotor activity, gait, strength, tone, eye contact. Mood euthymic. Affect flexible. Speech clear. Thought process organized. Content future-oriented. No evidence of suicidal or homicidal ideations or behaviors. No paranoia, delusions or hallucinations. Orientation, concentration, recent and remote memory are grossly intact. Fund of knowledge fair. Language use fair. Insight and judgment fair.     MEDICATIONS:   Luvox 100 mg daily (cont)  Remeron 7.5 mg at bed (cont)  Strattera 60 mg daily (cont)  Buspar 10 mg bid (cont)  Hydroxyzine 10 mg tid prn (cont)    ASSESSMENT:   MDD recurrent moderate  OCD  ADHD   Borderline Traits    PLAN: Continue IOP and current medications. Support, reassurance provided. Tentative discharge 4/24 or possibly 5/1 if needed. Discuss with team.                           Electronically signed by Luis Eduardo Lopez MD on 4/15/2025 at 12:41 PM

## 2025-04-15 NOTE — GROUP NOTE
Group Therapy Note    Date: 4/15/2025    Group Start Time:  8:45 AM  Group End Time: 10:30 AM  Group Topic: Psychotherapy    [facility] 7S OP Melonie Mora LISW-S    Group Therapy Note    Attendees: 7     Topic: Daily check in      Mode of intervention: catharsis, universality, existential factors, group cohesion, instillation of hope and correction of the primary family experience.      Patient's Goal: To complete daily check in; To increase socialization, improve communication of feelings, and learn healthy coping skills.             Mode of intervention: Education, support, socialization, exploration, self-understanding, altruism, instillation of hope, and universality.             Notes: Pt was an active participant in group focused on expression of feelings related to stressors, and how they impact mental health symptoms and substance use. Group centered on current symptom presentation, interpersonal relationships, communication, coping skills, and need for self-compassion. Pt shared in check in that he has had more energy and has been fairly productive at home. He did report he has OCD re: laundry but was able to communicate to dad and was able to change his laundry daus last week as a result.. He states that he and parents have been getting along better. He made positive connections with others through sharing personal information and providing support to other group members.           Check In Completed and Reviewed? Yes             Intervention needed? No     Status After Intervention:  Improved    Participation Level: Active Listener and Interactive    Participation Quality: Appropriate, Attentive, Sharing, and Supportive      Speech:  normal      Thought Process/Content: Logical      Affective Functioning: Congruent      Mood: euthymic      Level of consciousness:  Alert, Oriented x4, and Attentive      Response to Learning: Able to

## 2025-04-15 NOTE — GROUP NOTE
Group Therapy Note      Date:  4/15/2025    Start Time: 10:45 am    End Time:  12:00 pm    Number of Participants: 6    Type of group: Psychoeducation    Topic: Video:  Stress - Portrait of a Killer, [employer]     Objective:  To gain a better understanding of the affects of stress.       Group Therapy Note:    Pt was actively engaged in watching the video and discussion.  Pt has gained additional knowledge of how stress works and ways we can combat it and live a life free of chronic stress.  Stress can have an impact to our immune systems, create ulcers, affect heart, brain, and weight gain.  Discussion around how we can change situations that causes us stress and live a more fulfilling life.          Status After Intervention:  Improved    Participation Level: Active Listener    Participation Quality: Appropriate and Attentive      Speech:  normal      Thought Process/Content: Logical      Affective Functioning: Congruent      Mood: euthymic      Level of consciousness:  Alert, Oriented x4, and Attentive      Response to Learning: Able to verbalize current knowledge/experience, Able to verbalize/acknowledge new learning, Able to retain information, Capable of insight, Able to change behavior, and Progressing to goal      Endings: None Reported    Modes of Intervention: Education, Support, Socialization, Exploration, Clarifying, and Problem-solving      Discipline Responsible: /Counselor      Signature:  Faith Knowles, MSW, LSW

## 2025-04-24 ENCOUNTER — HOSPITAL ENCOUNTER (OUTPATIENT)
Dept: PSYCHIATRY | Age: 20
Setting detail: THERAPIES SERIES
Discharge: HOME | End: 2025-04-24
Payer: COMMERCIAL

## 2025-04-24 PROCEDURE — 90832 PSYTX W PT 30 MINUTES: CPT

## 2025-04-24 PROCEDURE — H2020 THER BEHAV SVC, PER DIEM: HCPCS
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
## 2025-04-24 NOTE — GROUP NOTE
Group Therapy Note    Date: 4/24/2025    Group Start Time:  8:45 AM  Group End Time: 12:00 PM  Group Topic: Psychoeducation    SEY[facility] 7S OP Melonie Mora LISW-S    Group Therapy Note    Attendees: 5     Topic: Distress Tolerance-Distracting with other thoughts and with other sensations; and Self soothing with our 5 senses.       Goal: Pt will participate in discussion of how skills of distracting ourselves and using self-soothing skills are helpful.       Group Therapy Note: Pt was an active participant in group discussion of above concepts. We then did a brief mindfulness activity. Pt engaged without difficulty and was open to learning new information. Pt identified enjoying brief activity and reported some improvement in mood following group participation.       Check In Completed and Reviewed? Yes         Intervention needed? No      Status After Intervention:  Improved    Participation Level: Active Listener and Interactive    Participation Quality: Appropriate, Attentive, Sharing, and Supportive      Speech:  normal      Thought Process/Content: Logical      Affective Functioning: Congruent      Mood: anxious and depressed but more hopeful      Level of consciousness:  Alert, Oriented x4, and Attentive      Response to Learning: Able to verbalize current knowledge/experience, Able to verbalize/acknowledge new learning, Able to retain information, Able to change behavior, and Progressing to goal      Endings: None Reported    Modes of Intervention: Education, Support, Socialization, Exploration, Problem-solving, and Activity      Discipline Responsible: /Counselor      Signature:  DENIS Vega

## 2025-04-28 ENCOUNTER — HOSPITAL ENCOUNTER (OUTPATIENT)
Dept: PSYCHIATRY | Age: 20
Setting detail: THERAPIES SERIES
Discharge: HOME | End: 2025-04-28
Payer: COMMERCIAL

## 2025-04-28 PROCEDURE — H2012 BEHAV HLTH DAY TREAT, PER HR: HCPCS

## 2025-04-28 NOTE — GROUP NOTE
Group Therapy Note    Date: 4/28/2025    Group Start Time:  8:45 AM  Group End Time: 12:00 PM  Group Topic: Psychoeducation    SE[facility] 7S OP Melonie Mora LISW-S    Group Therapy Note    Attendees: 12     Topic: Nutrition- Rate your plate     Goal: Pt will be able to identify importance and benefits of eating a balanced diet consisting of fruits, vegetables, grains, protein, and dairy while trying to cut down on processed foods and sugar. Class also reviewed importance of drinking enough water.     SAUNDRA Vega LISW-S Debbie Hlad, RDLDCORES   Derick Meza, KARY Knowles, MSW, LSW      Group Therapy Note: Pt was an active participant in nutrition group and asked appropriate questions r/t his digestive problems and food sensitivities. Pt engaged without difficulty, was able to share own experiences, and was open to learning new information.       Check In Completed and Reviewed? Yes       Intervention needed? No     Status After Intervention:  Improved    Participation Level: Active Listener and Interactive    Participation Quality: Appropriate, Attentive, Sharing, and Supportive      Speech:  normal      Thought Process/Content: Logical      Affective Functioning: Congruent      Mood: anxious and depressed      Level of consciousness:  Alert, Oriented x4, and Attentive      Response to Learning: Able to verbalize current knowledge/experience, Able to verbalize/acknowledge new learning, Able to retain information, Capable of insight, Able to change behavior, and Progressing to goal      Endings: None Reported    Modes of Intervention: Education, Support, Socialization, Exploration, and Problem-solving      Discipline Responsible: /Counselor      Signature:  DENIS Vega

## 2025-05-05 RX ORDER — FLUVOXAMINE MALEATE 100 MG
100 TABLET ORAL DAILY
Qty: 90 TABLET | Refills: 1 | OUTPATIENT
Start: 2025-05-05

## 2025-05-05 NOTE — TELEPHONE ENCOUNTER
Name of Medication(s) Requested:  Requested Prescriptions     Pending Prescriptions Disp Refills    fluvoxaMINE (LUVOX) 100 MG tablet [Pharmacy Med Name: FLUVOXAMINE MALEATE 100 MG TAB] 90 tablet 1     Sig: TAKE 1 TABLET BY MOUTH EVERY DAY       Medication is on current medication list Yes    Dosage and directions were verified? Yes    Quantity verified: 30 day supply     Pharmacy Verified?  Yes    Last Appointment:  Visit date not found    Future appts:  Future Appointments   Date Time Provider Department Center   5/12/2025 11:00 AM Samira Sena MD [facility]   6/4/2025  9:00 AM Twila Ptitman APRN - CNP [facility]        (If no appt send self scheduling link. .REFILLAPPT)  Scheduling request sent?     [] Yes  [] No    Does patient need updated?  [] Yes  [] No

## 2025-05-12 ENCOUNTER — OFFICE VISIT (OUTPATIENT)
Dept: PRIMARY CARE CLINIC | Age: 20
End: 2025-05-12
Payer: COMMERCIAL

## 2025-05-12 VITALS
DIASTOLIC BLOOD PRESSURE: 73 MMHG | BODY MASS INDEX: 20.49 KG/M2 | OXYGEN SATURATION: 97 % | TEMPERATURE: 98.3 F | WEIGHT: 120 LBS | SYSTOLIC BLOOD PRESSURE: 118 MMHG | HEART RATE: 110 BPM | HEIGHT: 64 IN | RESPIRATION RATE: 16 BRPM

## 2025-05-12 DIAGNOSIS — K21.9 GASTROESOPHAGEAL REFLUX DISEASE, UNSPECIFIED WHETHER ESOPHAGITIS PRESENT: ICD-10-CM

## 2025-05-12 DIAGNOSIS — F50.82 RESTRICTIVE FOOD INTAKE DISORDER: ICD-10-CM

## 2025-05-12 DIAGNOSIS — E55.9 VITAMIN D DEFICIENCY: ICD-10-CM

## 2025-05-12 DIAGNOSIS — K59.00 CONSTIPATION, UNSPECIFIED CONSTIPATION TYPE: ICD-10-CM

## 2025-05-12 DIAGNOSIS — F39 MOOD DISORDER: Primary | ICD-10-CM

## 2025-05-12 PROCEDURE — 1036F TOBACCO NON-USER: CPT | Performed by: FAMILY MEDICINE

## 2025-05-12 PROCEDURE — 99214 OFFICE O/P EST MOD 30 MIN: CPT | Performed by: FAMILY MEDICINE

## 2025-05-12 PROCEDURE — G8420 CALC BMI NORM PARAMETERS: HCPCS | Performed by: FAMILY MEDICINE

## 2025-05-12 PROCEDURE — G2211 COMPLEX E/M VISIT ADD ON: HCPCS | Performed by: FAMILY MEDICINE

## 2025-05-12 PROCEDURE — G8427 DOCREV CUR MEDS BY ELIG CLIN: HCPCS | Performed by: FAMILY MEDICINE

## 2025-05-12 RX ORDER — FLUVOXAMINE MALEATE 100 MG
100 TABLET ORAL DAILY
Qty: 30 TABLET | Refills: 0 | Status: CANCELLED | OUTPATIENT
Start: 2025-05-12

## 2025-05-12 NOTE — PROGRESS NOTES
VOMITING 30 tablet 0    lansoprazole (PREVACID) 30 MG delayed release capsule TAKE 1 CAPSULE BY MOUTH IN THE MORNING AND IN THE EVENING 180 capsule 1    senna (SENOKOT) 8.6 MG tablet Take 1 to 2 tabs at bedtime as needed for constipation 60 tablet 1    cyproheptadine (PERIACTIN) 4 MG tablet Take 1 tablet by mouth 2 times daily as needed (for appetite) 60 tablet 5    docusate sodium (COLACE) 100 MG capsule Take 1 capsule by mouth 2 times daily as needed for Constipation 60 capsule 5    busPIRone (BUSPAR) 10 MG tablet TAKE 1 TABLET BY MOUTH TWICE A DAY IN THE MORNING AND IN THE EVENING 180 tablet 0    hydrOXYzine HCl (ATARAX) 10 MG tablet PRN         I have reviewed all pertinent PMHx, PSHx, FamHx, SocialHx, medications, and allergies and updated history as appropriate.    OBJECTIVE    VS: /73   Pulse (!) 110   Temp 98.3 °F (36.8 °C) (Oral)   Resp 16   Ht 1.626 m (5' 4\")   Wt 54.4 kg (120 lb)   SpO2 97%   BMI 20.60 kg/m²   Physical Exam  Constitutional:       General: He is not in acute distress.     Appearance: He is well-developed. He is not diaphoretic.      Comments: Thin   HENT:      Head: Normocephalic and atraumatic.   Eyes:      Conjunctiva/sclera: Conjunctivae normal.      Pupils: Pupils are equal, round, and reactive to light.   Cardiovascular:      Rate and Rhythm: Normal rate and regular rhythm.   Pulmonary:      Effort: Pulmonary effort is normal.      Breath sounds: Normal breath sounds.   Abdominal:      General: Bowel sounds are normal. There is no distension.      Palpations: Abdomen is soft.      Tenderness: There is no abdominal tenderness.      Hernia: No hernia is present.   Musculoskeletal:         General: No deformity. Normal range of motion.      Cervical back: Normal range of motion and neck supple.   Skin:     General: Skin is warm and dry.   Neurological:      Mental Status: He is alert and oriented to person, place, and time.      Motor: No weakness.

## 2025-06-04 ENCOUNTER — OFFICE VISIT (OUTPATIENT)
Age: 20
End: 2025-06-04
Payer: COMMERCIAL

## 2025-06-04 VITALS
RESPIRATION RATE: 16 BRPM | OXYGEN SATURATION: 96 % | DIASTOLIC BLOOD PRESSURE: 62 MMHG | BODY MASS INDEX: 21.34 KG/M2 | TEMPERATURE: 97 F | HEIGHT: 64 IN | SYSTOLIC BLOOD PRESSURE: 100 MMHG | WEIGHT: 125 LBS | HEART RATE: 100 BPM

## 2025-06-04 DIAGNOSIS — R63.4 WEIGHT LOSS: ICD-10-CM

## 2025-06-04 DIAGNOSIS — K59.00 CONSTIPATION, UNSPECIFIED CONSTIPATION TYPE: ICD-10-CM

## 2025-06-04 DIAGNOSIS — K21.9 GASTRO-ESOPHAGEAL REFLUX DISEASE WITHOUT ESOPHAGITIS: Primary | ICD-10-CM

## 2025-06-04 PROCEDURE — G8420 CALC BMI NORM PARAMETERS: HCPCS | Performed by: NURSE PRACTITIONER

## 2025-06-04 PROCEDURE — G8427 DOCREV CUR MEDS BY ELIG CLIN: HCPCS | Performed by: NURSE PRACTITIONER

## 2025-06-04 PROCEDURE — 99213 OFFICE O/P EST LOW 20 MIN: CPT | Performed by: NURSE PRACTITIONER

## 2025-06-04 PROCEDURE — 1036F TOBACCO NON-USER: CPT | Performed by: NURSE PRACTITIONER

## 2025-06-04 NOTE — PROGRESS NOTES
110   03/10/25 (!) 120        Physical Exam  Constitutional:       Appearance: Normal appearance.   Neurological:      Mental Status: He is alert.         Past Medical History:   Diagnosis Date    ADHD     Chronic depression     OCD (obsessive compulsive disorder)     Social anxiety disorder       Past Surgical History:   Procedure Laterality Date    HERNIA REPAIR  2009    Inguinal Hernia    PILONIDAL CYST EXCISION N/A 11/7/2023    EXCISION PILONIDAL CYST [10082] performed by Kofi Sanchez MD at [facility] OR    UPPER GASTROINTESTINAL ENDOSCOPY N/A 09/19/2023    EGD BIOPSY performed by David Colon MD at [facility] ENDOSCOPY      Family History   Problem Relation Age of Onset    Other Mother         IBS    Anxiety Disorder Mother     Asthma Father     Other Father     Anxiety Disorder Sister     Autism Sister     Anxiety Disorder Brother     Asthma Brother         Lab Results   Component Value Date    WBC 6.1 11/11/2024    HGB 14.0 11/11/2024    HCT 43.2 11/11/2024    MCV 84.9 11/11/2024     11/11/2024      Lab Results   Component Value Date     11/11/2024    K 4.2 11/11/2024     11/11/2024    CO2 28 11/11/2024    BUN 10 11/11/2024    CREATININE 0.8 11/11/2024    GLUCOSE 89 11/11/2024    CALCIUM 9.9 11/11/2024    BILITOT 0.3 11/11/2024    ALKPHOS 79 11/11/2024    AST 16 11/11/2024    ALT 8 11/11/2024    LABGLOM >90 11/11/2024             Assessment & Plan          ASSESSMENT/PLAN:    1. Gastro-esophageal reflux disease without esophagitis    -controlled with diet and Lansoprazole    2. Constipation, unspecified constipation type    -controlled with diet    3. Weight loss    -patient has gained 5 lbs since the last office visit.  Will continue to monitor  -continue with group therapy and psychiatry      Return for Follow up in 3 months.    An electronic signature was used to authenticate this note.    --ARTUR Flores - CNP

## 2025-06-18 RX ORDER — ATOMOXETINE 60 MG/1
CAPSULE ORAL DAILY
Qty: 30 CAPSULE | Refills: 0 | OUTPATIENT
Start: 2025-06-18

## 2025-06-18 RX ORDER — FLUVOXAMINE MALEATE 100 MG
100 TABLET ORAL DAILY
Qty: 30 TABLET | Refills: 0 | OUTPATIENT
Start: 2025-06-18

## 2025-06-20 ENCOUNTER — HOSPITAL ENCOUNTER (EMERGENCY)
Age: 20
Discharge: HOME | End: 2025-06-20
Attending: STUDENT IN AN ORGANIZED HEALTH CARE EDUCATION/TRAINING PROGRAM
Payer: COMMERCIAL

## 2025-06-20 ENCOUNTER — APPOINTMENT (OUTPATIENT)
Dept: ULTRASOUND IMAGING | Age: 20
End: 2025-06-20
Payer: COMMERCIAL

## 2025-06-20 VITALS
DIASTOLIC BLOOD PRESSURE: 79 MMHG | TEMPERATURE: 98.6 F | OXYGEN SATURATION: 98 % | BODY MASS INDEX: 19.63 KG/M2 | WEIGHT: 115 LBS | HEART RATE: 99 BPM | RESPIRATION RATE: 18 BRPM | HEIGHT: 64 IN | SYSTOLIC BLOOD PRESSURE: 116 MMHG

## 2025-06-20 DIAGNOSIS — N50.811 PAIN IN RIGHT TESTICLE: Primary | ICD-10-CM

## 2025-06-20 LAB
ALBUMIN SERPL-MCNC: 4.4 G/DL (ref 3.5–5.2)
ALP SERPL-CCNC: 89 U/L (ref 40–129)
ALT SERPL-CCNC: 13 U/L (ref 0–50)
ANION GAP SERPL CALCULATED.3IONS-SCNC: 11 MMOL/L (ref 7–16)
AST SERPL-CCNC: 18 U/L (ref 0–50)
BASOPHILS # BLD: 0.1 K/UL (ref 0–0.2)
BASOPHILS NFR BLD: 1 % (ref 0–2)
BILIRUB SERPL-MCNC: <0.2 MG/DL (ref 0–1.2)
BILIRUB UR QL STRIP: NEGATIVE
BUN SERPL-MCNC: 8 MG/DL (ref 6–20)
CALCIUM SERPL-MCNC: 9.1 MG/DL (ref 8.6–10)
CHLORIDE SERPL-SCNC: 104 MMOL/L (ref 98–107)
CLARITY UR: CLEAR
CO2 SERPL-SCNC: 23 MMOL/L (ref 22–29)
COLOR UR: YELLOW
CREAT SERPL-MCNC: 0.8 MG/DL (ref 0.7–1.2)
EOSINOPHIL # BLD: 0.28 K/UL (ref 0.05–0.5)
EOSINOPHILS RELATIVE PERCENT: 4 % (ref 0–6)
ERYTHROCYTE [DISTWIDTH] IN BLOOD BY AUTOMATED COUNT: 12.9 % (ref 11.5–15)
GFR, ESTIMATED: >90 ML/MIN/1.73M2
GLUCOSE SERPL-MCNC: 115 MG/DL (ref 74–99)
GLUCOSE UR STRIP-MCNC: NEGATIVE MG/DL
HCT VFR BLD AUTO: 41.5 % (ref 37–54)
HGB BLD-MCNC: 14 G/DL (ref 12.5–16.5)
HGB UR QL STRIP.AUTO: NEGATIVE
IMM GRANULOCYTES # BLD AUTO: <0.03 K/UL (ref 0–0.58)
IMM GRANULOCYTES NFR BLD: 0 % (ref 0–5)
KETONES UR STRIP-MCNC: NEGATIVE MG/DL
LACTATE BLDV-SCNC: 1.1 MMOL/L (ref 0.5–2.2)
LEUKOCYTE ESTERASE UR QL STRIP: NEGATIVE
LYMPHOCYTES NFR BLD: 2.6 K/UL (ref 1.5–4)
LYMPHOCYTES RELATIVE PERCENT: 34 % (ref 20–42)
MCH RBC QN AUTO: 27.4 PG (ref 26–35)
MCHC RBC AUTO-ENTMCNC: 33.7 G/DL (ref 32–34.5)
MCV RBC AUTO: 81.2 FL (ref 80–99.9)
MONOCYTES NFR BLD: 0.73 K/UL (ref 0.1–0.95)
MONOCYTES NFR BLD: 9 % (ref 2–12)
MUCOUS THREADS URNS QL MICRO: PRESENT
NEUTROPHILS NFR BLD: 52 % (ref 43–80)
NEUTS SEG NFR BLD: 4.04 K/UL (ref 1.8–7.3)
NITRITE UR QL STRIP: NEGATIVE
PH UR STRIP: 6.5 [PH] (ref 5–8)
PLATELET # BLD AUTO: 241 K/UL (ref 130–450)
PMV BLD AUTO: 10.7 FL (ref 7–12)
POTASSIUM SERPL-SCNC: 4.2 MMOL/L (ref 3.5–5.1)
PROT SERPL-MCNC: 7 G/DL (ref 6.4–8.3)
PROT UR STRIP-MCNC: NEGATIVE MG/DL
RBC # BLD AUTO: 5.11 M/UL (ref 3.8–5.8)
RBC #/AREA URNS HPF: NORMAL /HPF
SODIUM SERPL-SCNC: 137 MMOL/L (ref 136–145)
SP GR UR STRIP: 1.01 (ref 1–1.03)
UROBILINOGEN UR STRIP-ACNC: 0.2 EU/DL (ref 0–1)
WBC #/AREA URNS HPF: NORMAL /HPF
WBC OTHER # BLD: 7.8 K/UL (ref 4.5–11.5)

## 2025-06-20 PROCEDURE — 81001 URINALYSIS AUTO W/SCOPE: CPT

## 2025-06-20 PROCEDURE — 80053 COMPREHEN METABOLIC PANEL: CPT

## 2025-06-20 PROCEDURE — 83605 ASSAY OF LACTIC ACID: CPT

## 2025-06-20 PROCEDURE — 85025 COMPLETE CBC W/AUTO DIFF WBC: CPT

## 2025-06-20 PROCEDURE — 76870 US EXAM SCROTUM: CPT

## 2025-06-20 PROCEDURE — 99284 EMERGENCY DEPT VISIT MOD MDM: CPT

## 2025-06-20 ASSESSMENT — PAIN - FUNCTIONAL ASSESSMENT: PAIN_FUNCTIONAL_ASSESSMENT: 0-10

## 2025-06-20 ASSESSMENT — PAIN DESCRIPTION - PAIN TYPE: TYPE: ACUTE PAIN

## 2025-06-20 ASSESSMENT — PAIN SCALES - GENERAL: PAINLEVEL_OUTOF10: 3

## 2025-06-20 ASSESSMENT — PAIN DESCRIPTION - DESCRIPTORS: DESCRIPTORS: SORE;SHARP;BURNING

## 2025-06-20 ASSESSMENT — PAIN DESCRIPTION - ORIENTATION: ORIENTATION: RIGHT

## 2025-06-20 ASSESSMENT — PAIN DESCRIPTION - LOCATION: LOCATION: SCROTUM

## 2025-06-20 ASSESSMENT — PAIN DESCRIPTION - FREQUENCY: FREQUENCY: CONTINUOUS

## 2025-06-20 NOTE — ED PROVIDER NOTES
Independent TREVOR Visit.        [facility] EMERGENCY DEPARTMENT  ED  Encounter Note  Admit Date/RoomTime: 2025  2:49 AM  ED Room:   NAME: Antonino Sun  : 2005  MRN: [id number]  PCP: Samira Sena MD    CHIEF COMPLAINT     Testicle Pain (Pt states, \"I smushed my balls the way I was sitting.\" Pt states pain began around midnight. )    HISTORY OF PRESENT ILLNESS        Antonino Sun is a 19 y.o. male who presents to the ED after sitting down this evening and developing right testicular pain.  Patient unsure exactly how he sat down but as he did he felt sudden onset of right testicular pain.  Patient denies history of testicular torsion.  Patient denies previous injury to the testicles.  Patient states pain to the lateral aspect of the right testicle then radiates to the right inguinal region.  Patient states left testicle does not hurt.  Patient states pain to the lateral aspect of the right testicle and extends up the cord.  Patient denies any penile discharge or dysuria.    REVIEW OF SYSTEMS     Pertinent positives and negatives are stated within HPI, all other systems reviewed and are negative.    Past Medical History:  has a past medical history of ADHD, Chronic depression, OCD (obsessive compulsive disorder), and Social anxiety disorder.  Surgical History:  has a past surgical history that includes hernia repair (); Upper gastrointestinal endoscopy (N/A, 2023); and Pilonidal cyst excision (N/A, 2023).  Social History:  reports that he has never smoked. He has never been exposed to tobacco smoke. He has never used smokeless tobacco. He reports that he does not currently use alcohol. He reports current drug use. Drug: Marijuana (Weed).  Family History: family history includes Anxiety Disorder in his brother, mother, and sister; Asthma in his brother and father; Autism in his sister; Other in his father and mother.   Allergies: Patient has no known

## 2025-08-19 DIAGNOSIS — G43.009 MIGRAINE WITHOUT AURA, NOT INTRACTABLE, WITHOUT STATUS MIGRAINOSUS: ICD-10-CM

## 2025-08-19 RX ORDER — ONDANSETRON 4 MG/1
4 TABLET, FILM COATED ORAL DAILY PRN
Qty: 30 TABLET | Refills: 1 | Status: SHIPPED | OUTPATIENT
Start: 2025-08-19

## 2025-08-19 RX ORDER — LANSOPRAZOLE 30 MG/1
CAPSULE, DELAYED RELEASE ORAL
Qty: 180 CAPSULE | Refills: 1 | Status: SHIPPED | OUTPATIENT
Start: 2025-08-19

## (undated) DEVICE — NEEDLE FLTR 18GA L1.5IN MEM THK5UM BLNT DISP

## (undated) DEVICE — BLADE ES ELASTOMERIC COAT INSUL DURABLE BEND UPTO 90DEG

## (undated) DEVICE — GLOVE ORANGE PI 7 1/2   MSG9075

## (undated) DEVICE — NDL CNTR 40CT FM MAG: Brand: MEDLINE INDUSTRIES, INC.

## (undated) DEVICE — TAPE,ELASTIC,FOAM,CURAD,3"X5.5YD,LF: Brand: CURAD

## (undated) DEVICE — SYRINGE IRRIG 60ML SFT PLIABLE BLB EZ TO GRP 1 HND USE W/

## (undated) DEVICE — PAD,ABDOMINAL,5"X9",ST,LF,25/BX: Brand: MEDLINE INDUSTRIES, INC.

## (undated) DEVICE — TOWEL,OR,DSP,ST,BLUE,STD,6/PK,12PK/CS: Brand: MEDLINE

## (undated) DEVICE — GAUZE,SPONGE,4"X4",16PLY,STRL,LF,10/TRAY: Brand: MEDLINE

## (undated) DEVICE — WIPES SKIN CLOTH READYPREP 9 X 10.5 IN 2% CHLORHEX GLUCONATE CHG PREOP

## (undated) DEVICE — MARKER,SKIN,WI/RULER AND LABELS: Brand: MEDLINE

## (undated) DEVICE — YANKAUER,BULB TIP,W/O VENT,RIGID,STERILE: Brand: MEDLINE

## (undated) DEVICE — GOWN,SIRUS,NONRNF,SETINSLV,XL,20/CS: Brand: MEDLINE

## (undated) DEVICE — 4-PORT MANIFOLD: Brand: NEPTUNE 2

## (undated) DEVICE — COVER,LIGHT HANDLE,FLX,1/PK: Brand: MEDLINE INDUSTRIES, INC.

## (undated) DEVICE — DEFENDO AIR WATER SUCTION AND BIOPSY VALVE KIT FOR  OLYMPUS: Brand: DEFENDO AIR/WATER/SUCTION AND BIOPSY VALVE

## (undated) DEVICE — BITEBLOCK 54FR W/ DENT RIM BLOX

## (undated) DEVICE — GAUZE,SPONGE,4"X4",8PLY,STRL,LF,10/TRAY: Brand: MEDLINE

## (undated) DEVICE — SYRINGE MED 10ML TRNSLUC BRL PLUNG BLK MRK POLYPR CTRL

## (undated) DEVICE — PACK,LAPAROTOMY,NO GOWNS: Brand: MEDLINE

## (undated) DEVICE — CONTAINER SPEC 60ML PH 7NEUTRAL BUFF FRMLN RDY TO USE

## (undated) DEVICE — ELECTRODE PT RET AD L9FT HI MOIST COND ADH HYDRGEL CORDED

## (undated) DEVICE — BASIC DOUBLE BASIN 2-LF: Brand: MEDLINE INDUSTRIES, INC.

## (undated) DEVICE — BLADE,STAINLESS-STEEL,15,STRL,DISPOSABLE: Brand: MEDLINE

## (undated) DEVICE — FORCEPS BX PED L160CM JAW DIA1.8MM WRK CHN 2MM GASTRO YEL

## (undated) DEVICE — TUBING, SUCTION, 1/4" X 10', STRAIGHT: Brand: MEDLINE

## (undated) DEVICE — CONNECTOR IRRIGATION AUXILIARY H2O JET W/ PRT MTL THRD HYDR

## (undated) DEVICE — GAUZE,SPONGE,4"X4",16PLY,XRAY,STRL,LF: Brand: MEDLINE